# Patient Record
Sex: FEMALE | Race: WHITE | NOT HISPANIC OR LATINO | Employment: OTHER
[De-identification: names, ages, dates, MRNs, and addresses within clinical notes are randomized per-mention and may not be internally consistent; named-entity substitution may affect disease eponyms.]

---

## 2017-10-23 ENCOUNTER — RECORDS - HEALTHEAST (OUTPATIENT)
Dept: ADMINISTRATIVE | Facility: OTHER | Age: 82
End: 2017-10-23

## 2017-10-23 LAB
LAB AP CHARGES (HE HISTORICAL CONVERSION): NORMAL
PATH REPORT.COMMENTS IMP SPEC: NORMAL
PATH REPORT.COMMENTS IMP SPEC: NORMAL
PATH REPORT.FINAL DX SPEC: NORMAL
PATH REPORT.GROSS SPEC: NORMAL
PATH REPORT.MICROSCOPIC SPEC OTHER STN: NORMAL
PATH REPORT.RELEVANT HX SPEC: NORMAL
RESULT FLAG (HE HISTORICAL CONVERSION): NORMAL

## 2017-11-15 ENCOUNTER — HOSPITAL ENCOUNTER (OUTPATIENT)
Dept: MAMMOGRAPHY | Facility: CLINIC | Age: 82
Discharge: HOME | End: 2017-11-15

## 2017-11-15 ENCOUNTER — COMMUNICATION - HEALTHEAST (OUTPATIENT)
Dept: TELEHEALTH | Facility: CLINIC | Age: 82
End: 2017-11-15

## 2017-11-15 DIAGNOSIS — Z12.31 VISIT FOR SCREENING MAMMOGRAM: ICD-10-CM

## 2018-05-07 ENCOUNTER — RECORDS - HEALTHEAST (OUTPATIENT)
Dept: LAB | Facility: CLINIC | Age: 83
End: 2018-05-07

## 2018-05-07 LAB
ALBUMIN SERPL-MCNC: 3.9 G/DL (ref 3.5–5)
ALP SERPL-CCNC: 51 U/L (ref 45–120)
ALT SERPL W P-5'-P-CCNC: 12 U/L (ref 0–45)
ANION GAP SERPL CALCULATED.3IONS-SCNC: 11 MMOL/L (ref 5–18)
AST SERPL W P-5'-P-CCNC: 21 U/L (ref 0–40)
BILIRUB SERPL-MCNC: 0.6 MG/DL (ref 0–1)
BUN SERPL-MCNC: 17 MG/DL (ref 8–28)
CALCIUM SERPL-MCNC: 9.7 MG/DL (ref 8.5–10.5)
CHLORIDE BLD-SCNC: 102 MMOL/L (ref 98–107)
CHOLEST SERPL-MCNC: 196 MG/DL
CO2 SERPL-SCNC: 22 MMOL/L (ref 22–31)
CREAT SERPL-MCNC: 1.01 MG/DL (ref 0.6–1.1)
FASTING STATUS PATIENT QL REPORTED: ABNORMAL
GFR SERPL CREATININE-BSD FRML MDRD: 52 ML/MIN/1.73M2
GLUCOSE BLD-MCNC: 103 MG/DL (ref 70–125)
HDLC SERPL-MCNC: 47 MG/DL
LDLC SERPL CALC-MCNC: 116 MG/DL
POTASSIUM BLD-SCNC: 5.3 MMOL/L (ref 3.5–5)
PROT SERPL-MCNC: 6.6 G/DL (ref 6–8)
SODIUM SERPL-SCNC: 135 MMOL/L (ref 136–145)
TRIGL SERPL-MCNC: 164 MG/DL
TSH SERPL DL<=0.005 MIU/L-ACNC: 2.32 UIU/ML (ref 0.3–5)

## 2018-07-05 ENCOUNTER — RECORDS - HEALTHEAST (OUTPATIENT)
Dept: LAB | Facility: CLINIC | Age: 83
End: 2018-07-05

## 2018-07-05 LAB — URATE SERPL-MCNC: 3.4 MG/DL (ref 2–7.5)

## 2018-11-19 ENCOUNTER — RECORDS - HEALTHEAST (OUTPATIENT)
Dept: LAB | Facility: CLINIC | Age: 83
End: 2018-11-19

## 2018-11-19 LAB
ANION GAP SERPL CALCULATED.3IONS-SCNC: 10 MMOL/L (ref 5–18)
BUN SERPL-MCNC: 19 MG/DL (ref 8–28)
CALCIUM SERPL-MCNC: 10.1 MG/DL (ref 8.5–10.5)
CHLORIDE BLD-SCNC: 102 MMOL/L (ref 98–107)
CO2 SERPL-SCNC: 26 MMOL/L (ref 22–31)
CREAT SERPL-MCNC: 0.99 MG/DL (ref 0.6–1.1)
GFR SERPL CREATININE-BSD FRML MDRD: 53 ML/MIN/1.73M2
GLUCOSE BLD-MCNC: 125 MG/DL (ref 70–125)
POTASSIUM BLD-SCNC: 4.3 MMOL/L (ref 3.5–5)
SODIUM SERPL-SCNC: 138 MMOL/L (ref 136–145)

## 2019-03-20 ENCOUNTER — RECORDS - HEALTHEAST (OUTPATIENT)
Dept: LAB | Facility: CLINIC | Age: 84
End: 2019-03-20

## 2019-03-20 LAB — TSH SERPL DL<=0.005 MIU/L-ACNC: 1.04 UIU/ML (ref 0.3–5)

## 2019-07-24 ENCOUNTER — RECORDS - HEALTHEAST (OUTPATIENT)
Dept: LAB | Facility: CLINIC | Age: 84
End: 2019-07-24

## 2019-07-24 LAB
ALBUMIN SERPL-MCNC: 4.2 G/DL (ref 3.5–5)
ALP SERPL-CCNC: 64 U/L (ref 45–120)
ALT SERPL W P-5'-P-CCNC: 12 U/L (ref 0–45)
ANION GAP SERPL CALCULATED.3IONS-SCNC: 8 MMOL/L (ref 5–18)
AST SERPL W P-5'-P-CCNC: 20 U/L (ref 0–40)
BILIRUB SERPL-MCNC: 0.5 MG/DL (ref 0–1)
BUN SERPL-MCNC: 18 MG/DL (ref 8–28)
CALCIUM SERPL-MCNC: 9.9 MG/DL (ref 8.5–10.5)
CHLORIDE BLD-SCNC: 101 MMOL/L (ref 98–107)
CHOLEST SERPL-MCNC: 202 MG/DL
CO2 SERPL-SCNC: 26 MMOL/L (ref 22–31)
CREAT SERPL-MCNC: 1.14 MG/DL (ref 0.6–1.1)
FASTING STATUS PATIENT QL REPORTED: ABNORMAL
GFR SERPL CREATININE-BSD FRML MDRD: 45 ML/MIN/1.73M2
GLUCOSE BLD-MCNC: 130 MG/DL (ref 70–125)
HDLC SERPL-MCNC: 52 MG/DL
LDLC SERPL CALC-MCNC: 125 MG/DL
MAGNESIUM SERPL-MCNC: 2.2 MG/DL (ref 1.8–2.6)
POTASSIUM BLD-SCNC: 4.9 MMOL/L (ref 3.5–5)
PROT SERPL-MCNC: 6.8 G/DL (ref 6–8)
SODIUM SERPL-SCNC: 135 MMOL/L (ref 136–145)
TRIGL SERPL-MCNC: 125 MG/DL
TSH SERPL DL<=0.005 MIU/L-ACNC: 0.85 UIU/ML (ref 0.3–5)
VIT B12 SERPL-MCNC: 1238 PG/ML (ref 213–816)

## 2019-07-25 LAB — 25(OH)D3 SERPL-MCNC: 49.5 NG/ML (ref 30–80)

## 2019-08-14 ENCOUNTER — RECORDS - HEALTHEAST (OUTPATIENT)
Dept: LAB | Facility: CLINIC | Age: 84
End: 2019-08-14

## 2019-08-15 LAB — HBA1C MFR BLD: 6.2 % (ref 4.2–6.1)

## 2020-01-07 ENCOUNTER — RECORDS - HEALTHEAST (OUTPATIENT)
Dept: LAB | Facility: CLINIC | Age: 85
End: 2020-01-07

## 2020-01-07 LAB
ANION GAP SERPL CALCULATED.3IONS-SCNC: 9 MMOL/L (ref 5–18)
BUN SERPL-MCNC: 17 MG/DL (ref 8–28)
CALCIUM SERPL-MCNC: 9.6 MG/DL (ref 8.5–10.5)
CHLORIDE BLD-SCNC: 97 MMOL/L (ref 98–107)
CO2 SERPL-SCNC: 25 MMOL/L (ref 22–31)
CREAT SERPL-MCNC: 1.1 MG/DL (ref 0.6–1.1)
GFR SERPL CREATININE-BSD FRML MDRD: 47 ML/MIN/1.73M2
GLUCOSE BLD-MCNC: 107 MG/DL (ref 70–125)
POTASSIUM BLD-SCNC: 4.7 MMOL/L (ref 3.5–5)
SODIUM SERPL-SCNC: 131 MMOL/L (ref 136–145)

## 2020-01-08 LAB — HBA1C MFR BLD: 5.8 % (ref 4.2–6.1)

## 2020-09-25 ENCOUNTER — MEDICAL CORRESPONDENCE (OUTPATIENT)
Dept: HEALTH INFORMATION MANAGEMENT | Facility: CLINIC | Age: 85
End: 2020-09-25

## 2020-09-29 ENCOUNTER — RECORDS - HEALTHEAST (OUTPATIENT)
Dept: LAB | Facility: CLINIC | Age: 85
End: 2020-09-29

## 2020-09-29 LAB
ALBUMIN SERPL-MCNC: 4.2 G/DL (ref 3.5–5)
ALP SERPL-CCNC: 46 U/L (ref 45–120)
ALT SERPL W P-5'-P-CCNC: 10 U/L (ref 0–45)
ANION GAP SERPL CALCULATED.3IONS-SCNC: 11 MMOL/L (ref 5–18)
AST SERPL W P-5'-P-CCNC: 19 U/L (ref 0–40)
BILIRUB SERPL-MCNC: 0.6 MG/DL (ref 0–1)
BUN SERPL-MCNC: 18 MG/DL (ref 8–28)
CALCIUM SERPL-MCNC: 10.6 MG/DL (ref 8.5–10.5)
CHLORIDE BLD-SCNC: 99 MMOL/L (ref 98–107)
CHOLEST SERPL-MCNC: 195 MG/DL
CO2 SERPL-SCNC: 24 MMOL/L (ref 22–31)
CREAT SERPL-MCNC: 1.08 MG/DL (ref 0.6–1.1)
FASTING STATUS PATIENT QL REPORTED: NO
FERRITIN SERPL-MCNC: 359 NG/ML (ref 10–130)
GFR SERPL CREATININE-BSD FRML MDRD: 48 ML/MIN/1.73M2
GLUCOSE BLD-MCNC: 105 MG/DL (ref 70–125)
HDLC SERPL-MCNC: 56 MG/DL
IRON SATN MFR SERPL: 33 % (ref 20–50)
IRON SERPL-MCNC: 91 UG/DL (ref 42–175)
LDLC SERPL CALC-MCNC: 115 MG/DL
MAGNESIUM SERPL-MCNC: 1.9 MG/DL (ref 1.8–2.6)
POTASSIUM BLD-SCNC: 4.7 MMOL/L (ref 3.5–5)
PROT SERPL-MCNC: 6.7 G/DL (ref 6–8)
SODIUM SERPL-SCNC: 134 MMOL/L (ref 136–145)
TIBC SERPL-MCNC: 276 UG/DL (ref 313–563)
TRANSFERRIN SERPL-MCNC: 221 MG/DL (ref 212–360)
TRIGL SERPL-MCNC: 120 MG/DL
TSH SERPL DL<=0.005 MIU/L-ACNC: 1.56 UIU/ML (ref 0.3–5)
VIT B12 SERPL-MCNC: 521 PG/ML (ref 213–816)

## 2020-09-30 LAB — 25(OH)D3 SERPL-MCNC: 51.3 NG/ML (ref 30–80)

## 2020-12-03 ENCOUNTER — RECORDS - HEALTHEAST (OUTPATIENT)
Dept: LAB | Facility: CLINIC | Age: 85
End: 2020-12-03

## 2020-12-03 LAB
SARS-COV-2 PCR COMMENT: NORMAL
SARS-COV-2 RNA SPEC QL NAA+PROBE: NEGATIVE
SARS-COV-2 VIRUS SPECIMEN SOURCE: NORMAL

## 2020-12-08 ENCOUNTER — RECORDS - HEALTHEAST (OUTPATIENT)
Dept: LAB | Facility: CLINIC | Age: 85
End: 2020-12-08

## 2020-12-08 LAB
ANION GAP SERPL CALCULATED.3IONS-SCNC: 11 MMOL/L (ref 5–18)
BUN SERPL-MCNC: 22 MG/DL (ref 8–28)
CALCIUM SERPL-MCNC: 9 MG/DL (ref 8.5–10.5)
CHLORIDE BLD-SCNC: 101 MMOL/L (ref 98–107)
CO2 SERPL-SCNC: 23 MMOL/L (ref 22–31)
CREAT SERPL-MCNC: 1.08 MG/DL (ref 0.6–1.1)
FERRITIN SERPL-MCNC: 361 NG/ML (ref 10–130)
GFR SERPL CREATININE-BSD FRML MDRD: 48 ML/MIN/1.73M2
GLUCOSE BLD-MCNC: 105 MG/DL (ref 70–125)
POTASSIUM BLD-SCNC: 4.6 MMOL/L (ref 3.5–5)
SODIUM SERPL-SCNC: 135 MMOL/L (ref 136–145)

## 2021-01-01 ENCOUNTER — LAB REQUISITION (OUTPATIENT)
Dept: LAB | Facility: CLINIC | Age: 86
End: 2021-01-01
Payer: COMMERCIAL

## 2021-01-01 ENCOUNTER — RECORDS - HEALTHEAST (OUTPATIENT)
Dept: ADMINISTRATIVE | Facility: CLINIC | Age: 86
End: 2021-01-01

## 2021-01-01 ENCOUNTER — LAB REQUISITION (OUTPATIENT)
Dept: LAB | Facility: CLINIC | Age: 86
End: 2021-01-01

## 2021-01-01 ENCOUNTER — RECORDS - HEALTHEAST (OUTPATIENT)
Dept: SCHEDULING | Facility: CLINIC | Age: 86
End: 2021-01-01

## 2021-01-01 DIAGNOSIS — U07.1 COVID-19: ICD-10-CM

## 2021-01-01 DIAGNOSIS — E03.9 HYPOTHYROIDISM, UNSPECIFIED: ICD-10-CM

## 2021-01-01 DIAGNOSIS — Z12.31 OTHER SCREENING MAMMOGRAM: ICD-10-CM

## 2021-01-01 DIAGNOSIS — R89.9 UNSPECIFIED ABNORMAL FINDING IN SPECIMENS FROM OTHER ORGANS, SYSTEMS AND TISSUES: ICD-10-CM

## 2021-01-01 DIAGNOSIS — I10 ESSENTIAL (PRIMARY) HYPERTENSION: ICD-10-CM

## 2021-01-01 DIAGNOSIS — D64.9 ANEMIA, UNSPECIFIED: ICD-10-CM

## 2021-01-01 LAB
ALBUMIN SERPL-MCNC: 3.7 G/DL (ref 3.5–5)
ALP SERPL-CCNC: 58 U/L (ref 45–120)
ALT SERPL W P-5'-P-CCNC: 11 U/L (ref 0–45)
ALT SERPL W P-5'-P-CCNC: 9 U/L (ref 0–45)
ANION GAP SERPL CALCULATED.3IONS-SCNC: 10 MMOL/L (ref 5–18)
AST SERPL W P-5'-P-CCNC: 17 U/L (ref 0–40)
AST SERPL W P-5'-P-CCNC: 19 U/L (ref 0–40)
BILIRUB SERPL-MCNC: 0.4 MG/DL (ref 0–1)
BUN SERPL-MCNC: 22 MG/DL (ref 8–28)
CALCIUM SERPL-MCNC: 9.9 MG/DL (ref 8.5–10.5)
CHLORIDE BLD-SCNC: 102 MMOL/L (ref 98–107)
CO2 SERPL-SCNC: 25 MMOL/L (ref 22–31)
CREAT SERPL-MCNC: 1.18 MG/DL (ref 0.6–1.1)
FERRITIN SERPL-MCNC: 307 NG/ML (ref 10–130)
FERRITIN SERPL-MCNC: 328 NG/ML (ref 10–130)
GFR SERPL CREATININE-BSD FRML MDRD: 44 ML/MIN/1.73M2
GLUCOSE BLD-MCNC: 109 MG/DL (ref 70–125)
IRON SATN MFR SERPL: 28 % (ref 20–50)
IRON SERPL-MCNC: 81 UG/DL (ref 42–175)
POTASSIUM BLD-SCNC: 4.8 MMOL/L (ref 3.5–5)
PROT SERPL-MCNC: 6.6 G/DL (ref 6–8)
SARS-COV-2 RNA RESP QL NAA+PROBE: NEGATIVE
SARS-COV-2 RNA RESP QL NAA+PROBE: NOT DETECTED
SARS-COV-2 RNA RESP QL NAA+PROBE: NOT DETECTED
SODIUM SERPL-SCNC: 137 MMOL/L (ref 136–145)
T4 FREE SERPL-MCNC: 1.05 NG/DL (ref 0.7–1.8)
TIBC SERPL-MCNC: 286 UG/DL (ref 313–563)
TRANSFERRIN SERPL-MCNC: 229 MG/DL (ref 212–360)
TSH SERPL DL<=0.005 MIU/L-ACNC: 5.39 UIU/ML (ref 0.3–5)

## 2021-01-01 PROCEDURE — U0005 INFEC AGEN DETEC AMPLI PROBE: HCPCS | Mod: ORL | Performed by: FAMILY MEDICINE

## 2021-01-01 PROCEDURE — 36415 COLL VENOUS BLD VENIPUNCTURE: CPT | Performed by: STUDENT IN AN ORGANIZED HEALTH CARE EDUCATION/TRAINING PROGRAM

## 2021-01-01 PROCEDURE — U0003 INFECTIOUS AGENT DETECTION BY NUCLEIC ACID (DNA OR RNA); SEVERE ACUTE RESPIRATORY SYNDROME CORONAVIRUS 2 (SARS-COV-2) (CORONAVIRUS DISEASE [COVID-19]), AMPLIFIED PROBE TECHNIQUE, MAKING USE OF HIGH THROUGHPUT TECHNOLOGIES AS DESCRIBED BY CMS-2020-01-R: HCPCS | Mod: ORL | Performed by: FAMILY MEDICINE

## 2021-01-01 PROCEDURE — 84460 ALANINE AMINO (ALT) (SGPT): CPT | Performed by: STUDENT IN AN ORGANIZED HEALTH CARE EDUCATION/TRAINING PROGRAM

## 2021-01-01 PROCEDURE — 82728 ASSAY OF FERRITIN: CPT | Performed by: STUDENT IN AN ORGANIZED HEALTH CARE EDUCATION/TRAINING PROGRAM

## 2021-01-01 PROCEDURE — 80053 COMPREHEN METABOLIC PANEL: CPT | Performed by: STUDENT IN AN ORGANIZED HEALTH CARE EDUCATION/TRAINING PROGRAM

## 2021-01-01 PROCEDURE — 83540 ASSAY OF IRON: CPT | Performed by: STUDENT IN AN ORGANIZED HEALTH CARE EDUCATION/TRAINING PROGRAM

## 2021-01-01 PROCEDURE — 84443 ASSAY THYROID STIM HORMONE: CPT | Performed by: STUDENT IN AN ORGANIZED HEALTH CARE EDUCATION/TRAINING PROGRAM

## 2021-01-01 PROCEDURE — 84450 TRANSFERASE (AST) (SGOT): CPT | Performed by: STUDENT IN AN ORGANIZED HEALTH CARE EDUCATION/TRAINING PROGRAM

## 2021-01-01 PROCEDURE — 84439 ASSAY OF FREE THYROXINE: CPT | Performed by: STUDENT IN AN ORGANIZED HEALTH CARE EDUCATION/TRAINING PROGRAM

## 2021-03-29 ENCOUNTER — RECORDS - HEALTHEAST (OUTPATIENT)
Dept: LAB | Facility: CLINIC | Age: 86
End: 2021-03-29

## 2021-03-29 LAB
ALBUMIN SERPL-MCNC: 3.9 G/DL (ref 3.5–5)
ALP SERPL-CCNC: 49 U/L (ref 45–120)
ALT SERPL W P-5'-P-CCNC: <9 U/L (ref 0–45)
ANION GAP SERPL CALCULATED.3IONS-SCNC: 10 MMOL/L (ref 5–18)
AST SERPL W P-5'-P-CCNC: 19 U/L (ref 0–40)
BILIRUB SERPL-MCNC: 0.6 MG/DL (ref 0–1)
BUN SERPL-MCNC: 20 MG/DL (ref 8–28)
CALCIUM SERPL-MCNC: 9.4 MG/DL (ref 8.5–10.5)
CHLORIDE BLD-SCNC: 101 MMOL/L (ref 98–107)
CHOLEST SERPL-MCNC: 172 MG/DL
CO2 SERPL-SCNC: 25 MMOL/L (ref 22–31)
CREAT SERPL-MCNC: 1.1 MG/DL (ref 0.6–1.1)
FASTING STATUS PATIENT QL REPORTED: ABNORMAL
FERRITIN SERPL-MCNC: 379 NG/ML (ref 10–130)
GFR SERPL CREATININE-BSD FRML MDRD: 47 ML/MIN/1.73M2
GLUCOSE BLD-MCNC: 100 MG/DL (ref 70–125)
HDLC SERPL-MCNC: 46 MG/DL
LDLC SERPL CALC-MCNC: 100 MG/DL
POTASSIUM BLD-SCNC: 5.1 MMOL/L (ref 3.5–5)
PROT SERPL-MCNC: 6.4 G/DL (ref 6–8)
SODIUM SERPL-SCNC: 136 MMOL/L (ref 136–145)
TRIGL SERPL-MCNC: 130 MG/DL

## 2021-05-08 ENCOUNTER — RECORDS - HEALTHEAST (OUTPATIENT)
Dept: LAB | Facility: CLINIC | Age: 86
End: 2021-05-08

## 2021-05-16 ENCOUNTER — RECORDS - HEALTHEAST (OUTPATIENT)
Dept: LAB | Facility: CLINIC | Age: 86
End: 2021-05-16

## 2021-05-25 ENCOUNTER — RECORDS - HEALTHEAST (OUTPATIENT)
Dept: ADMINISTRATIVE | Facility: CLINIC | Age: 86
End: 2021-05-25

## 2021-05-26 ENCOUNTER — RECORDS - HEALTHEAST (OUTPATIENT)
Dept: ADMINISTRATIVE | Facility: CLINIC | Age: 86
End: 2021-05-26

## 2021-05-27 ENCOUNTER — RECORDS - HEALTHEAST (OUTPATIENT)
Dept: ADMINISTRATIVE | Facility: CLINIC | Age: 86
End: 2021-05-27

## 2021-05-28 ENCOUNTER — RECORDS - HEALTHEAST (OUTPATIENT)
Dept: ADMINISTRATIVE | Facility: CLINIC | Age: 86
End: 2021-05-28

## 2021-05-29 ENCOUNTER — RECORDS - HEALTHEAST (OUTPATIENT)
Dept: ADMINISTRATIVE | Facility: CLINIC | Age: 86
End: 2021-05-29

## 2021-05-30 ENCOUNTER — RECORDS - HEALTHEAST (OUTPATIENT)
Dept: ADMINISTRATIVE | Facility: CLINIC | Age: 86
End: 2021-05-30

## 2021-07-13 ENCOUNTER — RECORDS - HEALTHEAST (OUTPATIENT)
Dept: ADMINISTRATIVE | Facility: CLINIC | Age: 86
End: 2021-07-13

## 2022-01-01 ENCOUNTER — LAB REQUISITION (OUTPATIENT)
Dept: LAB | Facility: CLINIC | Age: 87
End: 2022-01-01

## 2022-01-01 ENCOUNTER — APPOINTMENT (OUTPATIENT)
Dept: RADIOLOGY | Facility: CLINIC | Age: 87
DRG: 199 | End: 2022-01-01
Attending: EMERGENCY MEDICINE
Payer: COMMERCIAL

## 2022-01-01 ENCOUNTER — HOSPITAL ENCOUNTER (INPATIENT)
Facility: CLINIC | Age: 87
LOS: 2 days | DRG: 199 | End: 2022-07-15
Attending: EMERGENCY MEDICINE | Admitting: INTERNAL MEDICINE
Payer: COMMERCIAL

## 2022-01-01 VITALS
RESPIRATION RATE: 24 BRPM | SYSTOLIC BLOOD PRESSURE: 140 MMHG | BODY MASS INDEX: 35.26 KG/M2 | DIASTOLIC BLOOD PRESSURE: 73 MMHG | OXYGEN SATURATION: 91 % | WEIGHT: 186.6 LBS | HEART RATE: 96 BPM | TEMPERATURE: 96.8 F

## 2022-01-01 DIAGNOSIS — J96.01 ACUTE RESPIRATORY FAILURE WITH HYPOXIA (H): ICD-10-CM

## 2022-01-01 DIAGNOSIS — E03.9 HYPOTHYROIDISM, UNSPECIFIED: ICD-10-CM

## 2022-01-01 DIAGNOSIS — N18.31 CHRONIC KIDNEY DISEASE, STAGE 3A (H): ICD-10-CM

## 2022-01-01 DIAGNOSIS — R53.83 OTHER FATIGUE: ICD-10-CM

## 2022-01-01 DIAGNOSIS — N17.9 ACUTE RENAL FAILURE, UNSPECIFIED ACUTE RENAL FAILURE TYPE (H): ICD-10-CM

## 2022-01-01 DIAGNOSIS — I12.9 HYPERTENSIVE CHRONIC KIDNEY DISEASE WITH STAGE 1 THROUGH STAGE 4 CHRONIC KIDNEY DISEASE, OR UNSPECIFIED CHRONIC KIDNEY DISEASE: ICD-10-CM

## 2022-01-01 DIAGNOSIS — R91.8 LUNG MASS: ICD-10-CM

## 2022-01-01 DIAGNOSIS — E78.5 HYPERLIPIDEMIA, UNSPECIFIED: ICD-10-CM

## 2022-01-01 DIAGNOSIS — E55.9 VITAMIN D DEFICIENCY, UNSPECIFIED: ICD-10-CM

## 2022-01-01 DIAGNOSIS — J93.9 PNEUMOTHORAX ON RIGHT: ICD-10-CM

## 2022-01-01 DIAGNOSIS — R60.9 EDEMA, UNSPECIFIED: ICD-10-CM

## 2022-01-01 LAB
ALBUMIN SERPL-MCNC: 2.1 G/DL (ref 3.5–5)
ALP SERPL-CCNC: 68 U/L (ref 45–120)
ALT SERPL W P-5'-P-CCNC: <9 U/L (ref 0–45)
ANION GAP SERPL CALCULATED.3IONS-SCNC: 12 MMOL/L (ref 5–18)
ANION GAP SERPL CALCULATED.3IONS-SCNC: 12 MMOL/L (ref 5–18)
ANION GAP SERPL CALCULATED.3IONS-SCNC: 15 MMOL/L (ref 5–18)
ANION GAP SERPL CALCULATED.3IONS-SCNC: 16 MMOL/L (ref 5–18)
APPEARANCE FLD: ABNORMAL
AST SERPL W P-5'-P-CCNC: 13 U/L (ref 0–40)
ATRIAL RATE - MUSE: 98 BPM
BASE EXCESS BLDV CALC-SCNC: -6.2 MMOL/L
BASOPHILS # BLD MANUAL: 0 10E3/UL (ref 0–0.2)
BASOPHILS NFR BLD MANUAL: 0 %
BILIRUB SERPL-MCNC: 0.7 MG/DL (ref 0–1)
BUN SERPL-MCNC: 24 MG/DL (ref 8–28)
BUN SERPL-MCNC: 29 MG/DL (ref 8–28)
BUN SERPL-MCNC: 31 MG/DL (ref 8–28)
BUN SERPL-MCNC: 51 MG/DL (ref 8–28)
CALCIUM SERPL-MCNC: 8.5 MG/DL (ref 8.5–10.5)
CALCIUM SERPL-MCNC: 8.6 MG/DL (ref 8.5–10.5)
CALCIUM SERPL-MCNC: 9.8 MG/DL (ref 8.5–10.5)
CALCIUM SERPL-MCNC: 9.8 MG/DL (ref 8.5–10.5)
CELL COUNT BODY FLUID SOURCE: ABNORMAL
CHLORIDE BLD-SCNC: 102 MMOL/L (ref 98–107)
CHLORIDE BLD-SCNC: 105 MMOL/L (ref 98–107)
CHLORIDE BLD-SCNC: 95 MMOL/L (ref 98–107)
CHLORIDE BLD-SCNC: 96 MMOL/L (ref 98–107)
CHOLEST SERPL-MCNC: 314 MG/DL
CO2 SERPL-SCNC: 20 MMOL/L (ref 22–31)
CO2 SERPL-SCNC: 21 MMOL/L (ref 22–31)
CO2 SERPL-SCNC: 21 MMOL/L (ref 22–31)
CO2 SERPL-SCNC: 22 MMOL/L (ref 22–31)
COLOR FLD: YELLOW
CREAT SERPL-MCNC: 1.28 MG/DL (ref 0.6–1.1)
CREAT SERPL-MCNC: 1.31 MG/DL (ref 0.6–1.1)
CREAT SERPL-MCNC: 1.5 MG/DL (ref 0.6–1.1)
CREAT SERPL-MCNC: 4.64 MG/DL (ref 0.6–1.1)
DEPRECATED CALCIDIOL+CALCIFEROL SERPL-MC: 60 UG/L (ref 20–75)
DIASTOLIC BLOOD PRESSURE - MUSE: 102 MMHG
EOSINOPHIL # BLD MANUAL: 0 10E3/UL (ref 0–0.7)
EOSINOPHIL NFR BLD MANUAL: 0 %
ERYTHROCYTE [DISTWIDTH] IN BLOOD BY AUTOMATED COUNT: 14.5 % (ref 10–15)
FASTING STATUS PATIENT QL REPORTED: ABNORMAL
FERRITIN SERPL-MCNC: 292 NG/ML (ref 10–130)
GFR SERPL CREATININE-BSD FRML MDRD: 33 ML/MIN/1.73M2
GFR SERPL CREATININE-BSD FRML MDRD: 38 ML/MIN/1.73M2
GFR SERPL CREATININE-BSD FRML MDRD: 40 ML/MIN/1.73M2
GFR SERPL CREATININE-BSD FRML MDRD: 8 ML/MIN/1.73M2
GLUCOSE BLD-MCNC: 156 MG/DL (ref 70–125)
GLUCOSE BLD-MCNC: 174 MG/DL (ref 70–125)
GLUCOSE BLD-MCNC: 92 MG/DL (ref 70–125)
GLUCOSE BLD-MCNC: 96 MG/DL (ref 70–125)
GLUCOSE BODY FLUID SOURCE: NORMAL
GLUCOSE FLD-MCNC: <5 MG/DL
HCO3 BLDV-SCNC: 19 MMOL/L (ref 24–30)
HCT VFR BLD AUTO: 29.7 % (ref 35–47)
HDLC SERPL-MCNC: 46 MG/DL
HGB BLD-MCNC: 9.9 G/DL (ref 11.7–15.7)
INR PPP: 1.28 (ref 0.85–1.15)
INTERPRETATION ECG - MUSE: NORMAL
IRON SERPL-MCNC: 22 UG/DL (ref 42–175)
LACTATE SERPL-SCNC: 3.7 MMOL/L (ref 0.7–2)
LD BODY BODY FLUID SOURCE: NORMAL
LDH FLD L TO P-CCNC: 1416 U/L
LDLC SERPL CALC-MCNC: 232 MG/DL
LYMPHOCYTES # BLD MANUAL: 0.6 10E3/UL (ref 0.8–5.3)
LYMPHOCYTES NFR BLD MANUAL: 6 %
LYMPHOCYTES NFR FLD MANUAL: 6 %
MCH RBC QN AUTO: 30.7 PG (ref 26.5–33)
MCHC RBC AUTO-ENTMCNC: 33.3 G/DL (ref 31.5–36.5)
MCV RBC AUTO: 92 FL (ref 78–100)
METAMYELOCYTES # BLD MANUAL: 0.4 10E3/UL
METAMYELOCYTES NFR BLD MANUAL: 4 %
MONOCYTES # BLD MANUAL: 0.4 10E3/UL (ref 0–1.3)
MONOCYTES NFR BLD MANUAL: 4 %
MONOS+MACROS NFR FLD MANUAL: NORMAL %
MYELOCYTES # BLD MANUAL: 0.1 10E3/UL
MYELOCYTES NFR BLD MANUAL: 1 %
NEUTROPHILS # BLD MANUAL: 8.1 10E3/UL (ref 1.6–8.3)
NEUTROPHILS NFR BLD MANUAL: 85 %
NEUTS BAND NFR FLD MANUAL: 94 %
NRBC # BLD AUTO: 0.1 10E3/UL
NRBC BLD MANUAL-RTO: 1 %
OXYHGB MFR BLDV: 33.7 % (ref 70–75)
P AXIS - MUSE: 60 DEGREES
PCO2 BLDV: 45 MM HG (ref 35–50)
PH BLDV: 7.27 [PH] (ref 7.35–7.45)
PH BODY FLUID SOURCE: NORMAL
PH FLD: 7 PH
PLAT MORPH BLD: ABNORMAL
PLATELET # BLD AUTO: 346 10E3/UL (ref 150–450)
PO2 BLDV: 25 MM HG (ref 25–47)
POTASSIUM BLD-SCNC: 4.4 MMOL/L (ref 3.5–5)
POTASSIUM BLD-SCNC: 4.5 MMOL/L (ref 3.5–5)
POTASSIUM BLD-SCNC: 4.8 MMOL/L (ref 3.5–5)
POTASSIUM BLD-SCNC: 5.3 MMOL/L (ref 3.5–5)
PR INTERVAL - MUSE: 178 MS
PROT SERPL-MCNC: 6.1 G/DL (ref 6–8)
QRS DURATION - MUSE: 86 MS
QT - MUSE: 316 MS
QTC - MUSE: 403 MS
R AXIS - MUSE: 40 DEGREES
RADIOLOGIST FLAGS: ABNORMAL
RBC # BLD AUTO: 3.23 10E6/UL (ref 3.8–5.2)
RBC # FLD: 5 /UL
RBC MORPH BLD: ABNORMAL
SAO2 % BLDV: 34.3 % (ref 70–75)
SARS-COV-2 RNA RESP QL NAA+PROBE: NEGATIVE
SODIUM SERPL-SCNC: 129 MMOL/L (ref 136–145)
SODIUM SERPL-SCNC: 131 MMOL/L (ref 136–145)
SODIUM SERPL-SCNC: 136 MMOL/L (ref 136–145)
SODIUM SERPL-SCNC: 141 MMOL/L (ref 136–145)
SYSTOLIC BLOOD PRESSURE - MUSE: 203 MMHG
T AXIS - MUSE: -47 DEGREES
T4 FREE SERPL-MCNC: 0.7 NG/DL (ref 0.9–1.7)
T4 FREE SERPL-MCNC: 1.17 NG/DL (ref 0.7–1.8)
TRIGL SERPL-MCNC: 179 MG/DL
TROPONIN I SERPL-MCNC: 0.01 NG/ML (ref 0–0.29)
TSH SERPL DL<=0.005 MIU/L-ACNC: 36.69 UIU/ML (ref 0.3–5)
TSH SERPL DL<=0.005 MIU/L-ACNC: 6.24 UIU/ML (ref 0.3–5)
TSH SERPL DL<=0.005 MIU/L-ACNC: 7.59 UIU/ML (ref 0.3–5)
VENTRICULAR RATE- MUSE: 98 BPM
WBC # BLD AUTO: 9.5 10E3/UL (ref 4–11)
WBC # FLD AUTO: ABNORMAL /UL

## 2022-01-01 PROCEDURE — 80053 COMPREHEN METABOLIC PANEL: CPT | Performed by: EMERGENCY MEDICINE

## 2022-01-01 PROCEDURE — 80061 LIPID PANEL: CPT | Performed by: STUDENT IN AN ORGANIZED HEALTH CARE EDUCATION/TRAINING PROGRAM

## 2022-01-01 PROCEDURE — 999N000065 XR CHEST PORT 1 VIEW

## 2022-01-01 PROCEDURE — 250N000011 HC RX IP 250 OP 636: Performed by: INTERNAL MEDICINE

## 2022-01-01 PROCEDURE — 84484 ASSAY OF TROPONIN QUANT: CPT | Performed by: EMERGENCY MEDICINE

## 2022-01-01 PROCEDURE — 83605 ASSAY OF LACTIC ACID: CPT | Performed by: EMERGENCY MEDICINE

## 2022-01-01 PROCEDURE — 99239 HOSP IP/OBS DSCHRG MGMT >30: CPT | Performed by: INTERNAL MEDICINE

## 2022-01-01 PROCEDURE — 250N000009 HC RX 250: Performed by: INTERNAL MEDICINE

## 2022-01-01 PROCEDURE — 99233 SBSQ HOSP IP/OBS HIGH 50: CPT | Performed by: CLINICAL NURSE SPECIALIST

## 2022-01-01 PROCEDURE — 85007 BL SMEAR W/DIFF WBC COUNT: CPT | Performed by: EMERGENCY MEDICINE

## 2022-01-01 PROCEDURE — 83986 ASSAY PH BODY FLUID NOS: CPT | Performed by: EMERGENCY MEDICINE

## 2022-01-01 PROCEDURE — 80048 BASIC METABOLIC PNL TOTAL CA: CPT | Performed by: STUDENT IN AN ORGANIZED HEALTH CARE EDUCATION/TRAINING PROGRAM

## 2022-01-01 PROCEDURE — 87070 CULTURE OTHR SPECIMN AEROBIC: CPT | Performed by: EMERGENCY MEDICINE

## 2022-01-01 PROCEDURE — 82945 GLUCOSE OTHER FLUID: CPT | Performed by: EMERGENCY MEDICINE

## 2022-01-01 PROCEDURE — 82728 ASSAY OF FERRITIN: CPT | Performed by: NURSE PRACTITIONER

## 2022-01-01 PROCEDURE — 84443 ASSAY THYROID STIM HORMONE: CPT | Performed by: STUDENT IN AN ORGANIZED HEALTH CARE EDUCATION/TRAINING PROGRAM

## 2022-01-01 PROCEDURE — 99285 EMERGENCY DEPT VISIT HI MDM: CPT | Mod: 25

## 2022-01-01 PROCEDURE — 99223 1ST HOSP IP/OBS HIGH 75: CPT | Performed by: INTERNAL MEDICINE

## 2022-01-01 PROCEDURE — 99232 SBSQ HOSP IP/OBS MODERATE 35: CPT | Performed by: INTERNAL MEDICINE

## 2022-01-01 PROCEDURE — 250N000011 HC RX IP 250 OP 636: Performed by: EMERGENCY MEDICINE

## 2022-01-01 PROCEDURE — C9803 HOPD COVID-19 SPEC COLLECT: HCPCS

## 2022-01-01 PROCEDURE — 82805 BLOOD GASES W/O2 SATURATION: CPT | Performed by: EMERGENCY MEDICINE

## 2022-01-01 PROCEDURE — 32551 INSERTION OF CHEST TUBE: CPT | Mod: RT

## 2022-01-01 PROCEDURE — 250N000009 HC RX 250: Performed by: CLINICAL NURSE SPECIALIST

## 2022-01-01 PROCEDURE — 80048 BASIC METABOLIC PNL TOTAL CA: CPT | Performed by: NURSE PRACTITIONER

## 2022-01-01 PROCEDURE — 82306 VITAMIN D 25 HYDROXY: CPT | Performed by: STUDENT IN AN ORGANIZED HEALTH CARE EDUCATION/TRAINING PROGRAM

## 2022-01-01 PROCEDURE — 999N000157 HC STATISTIC RCP TIME EA 10 MIN

## 2022-01-01 PROCEDURE — 71045 X-RAY EXAM CHEST 1 VIEW: CPT

## 2022-01-01 PROCEDURE — 89051 BODY FLUID CELL COUNT: CPT | Performed by: EMERGENCY MEDICINE

## 2022-01-01 PROCEDURE — 96361 HYDRATE IV INFUSION ADD-ON: CPT

## 2022-01-01 PROCEDURE — 0W9930Z DRAINAGE OF RIGHT PLEURAL CAVITY WITH DRAINAGE DEVICE, PERCUTANEOUS APPROACH: ICD-10-PCS | Performed by: EMERGENCY MEDICINE

## 2022-01-01 PROCEDURE — 36415 COLL VENOUS BLD VENIPUNCTURE: CPT | Performed by: EMERGENCY MEDICINE

## 2022-01-01 PROCEDURE — 83540 ASSAY OF IRON: CPT | Performed by: NURSE PRACTITIONER

## 2022-01-01 PROCEDURE — 96375 TX/PRO/DX INJ NEW DRUG ADDON: CPT

## 2022-01-01 PROCEDURE — 87040 BLOOD CULTURE FOR BACTERIA: CPT | Performed by: EMERGENCY MEDICINE

## 2022-01-01 PROCEDURE — 84443 ASSAY THYROID STIM HORMONE: CPT | Performed by: EMERGENCY MEDICINE

## 2022-01-01 PROCEDURE — 250N000013 HC RX MED GY IP 250 OP 250 PS 637: Performed by: CLINICAL NURSE SPECIALIST

## 2022-01-01 PROCEDURE — 250N000013 HC RX MED GY IP 250 OP 250 PS 637: Performed by: INTERNAL MEDICINE

## 2022-01-01 PROCEDURE — 120N000001 HC R&B MED SURG/OB

## 2022-01-01 PROCEDURE — 99222 1ST HOSP IP/OBS MODERATE 55: CPT | Performed by: NURSE PRACTITIONER

## 2022-01-01 PROCEDURE — U0005 INFEC AGEN DETEC AMPLI PROBE: HCPCS | Performed by: EMERGENCY MEDICINE

## 2022-01-01 PROCEDURE — 93005 ELECTROCARDIOGRAM TRACING: CPT | Performed by: EMERGENCY MEDICINE

## 2022-01-01 PROCEDURE — 85027 COMPLETE CBC AUTOMATED: CPT | Performed by: EMERGENCY MEDICINE

## 2022-01-01 PROCEDURE — 84439 ASSAY OF FREE THYROXINE: CPT | Performed by: EMERGENCY MEDICINE

## 2022-01-01 PROCEDURE — 96374 THER/PROPH/DIAG INJ IV PUSH: CPT

## 2022-01-01 PROCEDURE — 83615 LACTATE (LD) (LDH) ENZYME: CPT | Performed by: EMERGENCY MEDICINE

## 2022-01-01 PROCEDURE — 258N000003 HC RX IP 258 OP 636: Performed by: EMERGENCY MEDICINE

## 2022-01-01 PROCEDURE — 999N000009 HC STATISTIC AIRWAY CARE

## 2022-01-01 PROCEDURE — 84439 ASSAY OF FREE THYROXINE: CPT | Performed by: STUDENT IN AN ORGANIZED HEALTH CARE EDUCATION/TRAINING PROGRAM

## 2022-01-01 PROCEDURE — 250N000013 HC RX MED GY IP 250 OP 250 PS 637: Performed by: EMERGENCY MEDICINE

## 2022-01-01 PROCEDURE — 85610 PROTHROMBIN TIME: CPT | Performed by: EMERGENCY MEDICINE

## 2022-01-01 RX ORDER — LORAZEPAM 2 MG/ML
1 INJECTION INTRAMUSCULAR
Status: DISCONTINUED | OUTPATIENT
Start: 2022-01-01 | End: 2022-01-01 | Stop reason: HOSPADM

## 2022-01-01 RX ORDER — PROCHLORPERAZINE 25 MG
12.5 SUPPOSITORY, RECTAL RECTAL EVERY 12 HOURS PRN
Status: DISCONTINUED | OUTPATIENT
Start: 2022-01-01 | End: 2022-01-01 | Stop reason: HOSPADM

## 2022-01-01 RX ORDER — ONDANSETRON 4 MG/1
4 TABLET, ORALLY DISINTEGRATING ORAL EVERY 6 HOURS PRN
Status: DISCONTINUED | OUTPATIENT
Start: 2022-01-01 | End: 2022-01-01 | Stop reason: HOSPADM

## 2022-01-01 RX ORDER — NALOXONE HYDROCHLORIDE 0.4 MG/ML
0.1 INJECTION, SOLUTION INTRAMUSCULAR; INTRAVENOUS; SUBCUTANEOUS
Status: DISCONTINUED | OUTPATIENT
Start: 2022-01-01 | End: 2022-01-01 | Stop reason: HOSPADM

## 2022-01-01 RX ORDER — NALOXONE HYDROCHLORIDE 0.4 MG/ML
0.2 INJECTION, SOLUTION INTRAMUSCULAR; INTRAVENOUS; SUBCUTANEOUS
Status: DISCONTINUED | OUTPATIENT
Start: 2022-01-01 | End: 2022-01-01 | Stop reason: HOSPADM

## 2022-01-01 RX ORDER — HYDROMORPHONE HYDROCHLORIDE 1 MG/ML
.3-.5 INJECTION, SOLUTION INTRAMUSCULAR; INTRAVENOUS; SUBCUTANEOUS
Status: DISCONTINUED | OUTPATIENT
Start: 2022-01-01 | End: 2022-01-01 | Stop reason: HOSPADM

## 2022-01-01 RX ORDER — SCOLOPAMINE TRANSDERMAL SYSTEM 1 MG/1
1 PATCH, EXTENDED RELEASE TRANSDERMAL
Status: DISCONTINUED | OUTPATIENT
Start: 2022-01-01 | End: 2022-01-01 | Stop reason: HOSPADM

## 2022-01-01 RX ORDER — OXYCODONE HCL 20 MG/ML
10 CONCENTRATE, ORAL ORAL EVERY 6 HOURS
Status: DISCONTINUED | OUTPATIENT
Start: 2022-01-01 | End: 2022-01-01 | Stop reason: HOSPADM

## 2022-01-01 RX ORDER — ATROPINE SULFATE 10 MG/ML
2 SOLUTION/ DROPS OPHTHALMIC EVERY 4 HOURS PRN
Status: DISCONTINUED | OUTPATIENT
Start: 2022-01-01 | End: 2022-01-01 | Stop reason: HOSPADM

## 2022-01-01 RX ORDER — OXYCODONE HCL 20 MG/ML
5-10 CONCENTRATE, ORAL ORAL
Status: DISCONTINUED | OUTPATIENT
Start: 2022-01-01 | End: 2022-01-01 | Stop reason: HOSPADM

## 2022-01-01 RX ORDER — GLYCOPYRROLATE 0.2 MG/ML
0.2 INJECTION, SOLUTION INTRAMUSCULAR; INTRAVENOUS EVERY 4 HOURS
Status: COMPLETED | OUTPATIENT
Start: 2022-01-01 | End: 2022-01-01

## 2022-01-01 RX ORDER — ACETAMINOPHEN 500 MG
1000 TABLET ORAL EVERY 8 HOURS PRN
COMMUNITY

## 2022-01-01 RX ORDER — HYDROMORPHONE HYDROCHLORIDE 1 MG/ML
1-2 SOLUTION ORAL
Status: DISCONTINUED | OUTPATIENT
Start: 2022-01-01 | End: 2022-01-01 | Stop reason: ALTCHOICE

## 2022-01-01 RX ORDER — HALOPERIDOL 2 MG/ML
2 SOLUTION ORAL
Status: DISCONTINUED | OUTPATIENT
Start: 2022-01-01 | End: 2022-01-01 | Stop reason: HOSPADM

## 2022-01-01 RX ORDER — ACETAMINOPHEN 325 MG/1
650 TABLET ORAL EVERY 6 HOURS PRN
Status: DISCONTINUED | OUTPATIENT
Start: 2022-01-01 | End: 2022-01-01 | Stop reason: HOSPADM

## 2022-01-01 RX ORDER — AMOXICILLIN 250 MG
1 CAPSULE ORAL 2 TIMES DAILY
Status: DISCONTINUED | OUTPATIENT
Start: 2022-01-01 | End: 2022-01-01 | Stop reason: HOSPADM

## 2022-01-01 RX ORDER — PANTOPRAZOLE SODIUM 20 MG/1
40 TABLET, DELAYED RELEASE ORAL
Status: DISCONTINUED | OUTPATIENT
Start: 2022-01-01 | End: 2022-01-01 | Stop reason: ALTCHOICE

## 2022-01-01 RX ORDER — HYDROMORPHONE HYDROCHLORIDE 1 MG/ML
.3-.5 INJECTION, SOLUTION INTRAMUSCULAR; INTRAVENOUS; SUBCUTANEOUS
Status: DISCONTINUED | OUTPATIENT
Start: 2022-01-01 | End: 2022-01-01

## 2022-01-01 RX ORDER — LORAZEPAM 2 MG/ML
1 INJECTION INTRAMUSCULAR
Status: DISCONTINUED | OUTPATIENT
Start: 2022-01-01 | End: 2022-01-01

## 2022-01-01 RX ORDER — HYDROMORPHONE HYDROCHLORIDE 1 MG/ML
1-2 SOLUTION ORAL
Status: DISCONTINUED | OUTPATIENT
Start: 2022-01-01 | End: 2022-01-01

## 2022-01-01 RX ORDER — ACETAMINOPHEN 650 MG/1
650 SUPPOSITORY RECTAL EVERY 6 HOURS PRN
Status: DISCONTINUED | OUTPATIENT
Start: 2022-01-01 | End: 2022-01-01 | Stop reason: HOSPADM

## 2022-01-01 RX ORDER — LORAZEPAM 2 MG/ML
0.5 CONCENTRATE ORAL EVERY 6 HOURS
Status: DISCONTINUED | OUTPATIENT
Start: 2022-01-01 | End: 2022-01-01 | Stop reason: HOSPADM

## 2022-01-01 RX ORDER — LORAZEPAM 1 MG/1
1 TABLET ORAL
Status: DISCONTINUED | OUTPATIENT
Start: 2022-01-01 | End: 2022-01-01

## 2022-01-01 RX ORDER — FENTANYL CITRATE 50 UG/ML
25 INJECTION, SOLUTION INTRAMUSCULAR; INTRAVENOUS
Status: DISCONTINUED | OUTPATIENT
Start: 2022-01-01 | End: 2022-01-01

## 2022-01-01 RX ORDER — FUROSEMIDE 40 MG
40 TABLET ORAL DAILY
COMMUNITY
Start: 2022-01-01

## 2022-01-01 RX ORDER — ASPIRIN 81 MG/1
81 TABLET ORAL DAILY
COMMUNITY

## 2022-01-01 RX ORDER — LOSARTAN POTASSIUM 25 MG/1
25 TABLET ORAL DAILY
COMMUNITY
Start: 2022-01-01

## 2022-01-01 RX ORDER — ONDANSETRON 2 MG/ML
4 INJECTION INTRAMUSCULAR; INTRAVENOUS EVERY 6 HOURS PRN
Status: DISCONTINUED | OUTPATIENT
Start: 2022-01-01 | End: 2022-01-01 | Stop reason: HOSPADM

## 2022-01-01 RX ORDER — LEVOTHYROXINE SODIUM 88 UG/1
88 TABLET ORAL DAILY
COMMUNITY
Start: 2022-01-01

## 2022-01-01 RX ORDER — PROCHLORPERAZINE MALEATE 5 MG
5 TABLET ORAL EVERY 6 HOURS PRN
Status: DISCONTINUED | OUTPATIENT
Start: 2022-01-01 | End: 2022-01-01 | Stop reason: HOSPADM

## 2022-01-01 RX ORDER — BISACODYL 10 MG
10 SUPPOSITORY, RECTAL RECTAL
Status: DISCONTINUED | OUTPATIENT
Start: 2022-07-16 | End: 2022-01-01 | Stop reason: HOSPADM

## 2022-01-01 RX ORDER — LANOLIN ALCOHOL/MO/W.PET/CERES
1000 CREAM (GRAM) TOPICAL DAILY
COMMUNITY

## 2022-01-01 RX ORDER — LORAZEPAM 1 MG/1
1 TABLET ORAL
Status: DISCONTINUED | OUTPATIENT
Start: 2022-01-01 | End: 2022-01-01 | Stop reason: HOSPADM

## 2022-01-01 RX ORDER — OMEGA-3 FATTY ACIDS/FISH OIL 300-1000MG
200-400 CAPSULE ORAL EVERY 6 HOURS PRN
COMMUNITY

## 2022-01-01 RX ORDER — PIPERACILLIN SODIUM, TAZOBACTAM SODIUM 3; .375 G/15ML; G/15ML
3.38 INJECTION, POWDER, LYOPHILIZED, FOR SOLUTION INTRAVENOUS ONCE
Status: DISCONTINUED | OUTPATIENT
Start: 2022-01-01 | End: 2022-01-01

## 2022-01-01 RX ADMIN — FENTANYL CITRATE 25 MCG: 50 INJECTION, SOLUTION INTRAMUSCULAR; INTRAVENOUS at 13:20

## 2022-01-01 RX ADMIN — HYDROMORPHONE HYDROCHLORIDE 0.5 MG: 1 INJECTION, SOLUTION INTRAMUSCULAR; INTRAVENOUS; SUBCUTANEOUS at 01:20

## 2022-01-01 RX ADMIN — HYDROMORPHONE HYDROCHLORIDE 0.5 MG: 1 INJECTION, SOLUTION INTRAMUSCULAR; INTRAVENOUS; SUBCUTANEOUS at 03:12

## 2022-01-01 RX ADMIN — GLYCOPYRROLATE 0.2 MG: 0.2 INJECTION INTRAMUSCULAR; INTRAVENOUS at 23:42

## 2022-01-01 RX ADMIN — ATROPINE SULFATE 2 DROP: 10 SOLUTION/ DROPS OPHTHALMIC at 09:10

## 2022-01-01 RX ADMIN — GLYCOPYRROLATE 0.2 MG: 0.2 INJECTION INTRAMUSCULAR; INTRAVENOUS at 09:40

## 2022-01-01 RX ADMIN — ATROPINE SULFATE 2 DROP: 10 SOLUTION/ DROPS OPHTHALMIC at 13:58

## 2022-01-01 RX ADMIN — LORAZEPAM 0.5 MG: 2 LIQUID ORAL at 09:43

## 2022-01-01 RX ADMIN — HYDROMORPHONE HYDROCHLORIDE 1 MG: 5 SOLUTION ORAL at 19:17

## 2022-01-01 RX ADMIN — HYDROMORPHONE HYDROCHLORIDE 1 MG: 5 SOLUTION ORAL at 16:54

## 2022-01-01 RX ADMIN — LORAZEPAM 1 MG: 1 TABLET ORAL at 17:15

## 2022-01-01 RX ADMIN — GLYCOPYRROLATE 0.2 MG: 0.2 INJECTION INTRAMUSCULAR; INTRAVENOUS at 16:42

## 2022-01-01 RX ADMIN — HYDROMORPHONE HYDROCHLORIDE 0.5 MG: 1 INJECTION, SOLUTION INTRAMUSCULAR; INTRAVENOUS; SUBCUTANEOUS at 06:53

## 2022-01-01 RX ADMIN — GLYCOPYRROLATE 0.2 MG: 0.2 INJECTION INTRAMUSCULAR; INTRAVENOUS at 20:35

## 2022-01-01 RX ADMIN — HYDROMORPHONE HYDROCHLORIDE 0.5 MG: 1 INJECTION, SOLUTION INTRAMUSCULAR; INTRAVENOUS; SUBCUTANEOUS at 21:20

## 2022-01-01 RX ADMIN — SCOPALAMINE 1 PATCH: 1 PATCH, EXTENDED RELEASE TRANSDERMAL at 11:58

## 2022-01-01 RX ADMIN — HYDROMORPHONE HYDROCHLORIDE 0.5 MG: 1 INJECTION, SOLUTION INTRAMUSCULAR; INTRAVENOUS; SUBCUTANEOUS at 23:42

## 2022-01-01 RX ADMIN — SODIUM CHLORIDE 1000 ML: 9 INJECTION, SOLUTION INTRAVENOUS at 13:07

## 2022-01-01 RX ADMIN — HYDROMORPHONE HYDROCHLORIDE 0.5 MG: 1 INJECTION, SOLUTION INTRAMUSCULAR; INTRAVENOUS; SUBCUTANEOUS at 05:32

## 2022-01-01 RX ADMIN — ATROPINE SULFATE 2 DROP: 10 SOLUTION/ DROPS OPHTHALMIC at 14:50

## 2022-01-01 RX ADMIN — HYDROMORPHONE HYDROCHLORIDE 0.3 MG: 1 INJECTION, SOLUTION INTRAMUSCULAR; INTRAVENOUS; SUBCUTANEOUS at 20:33

## 2022-01-01 RX ADMIN — HYDROMORPHONE HYDROCHLORIDE 0.5 MG: 1 INJECTION, SOLUTION INTRAMUSCULAR; INTRAVENOUS; SUBCUTANEOUS at 17:14

## 2022-01-01 RX ADMIN — HALOPERIDOL 2 MG: 2 SOLUTION ORAL at 23:55

## 2022-01-01 RX ADMIN — GLYCOPYRROLATE 0.2 MG: 0.2 INJECTION INTRAMUSCULAR; INTRAVENOUS at 03:59

## 2022-01-01 RX ADMIN — LORAZEPAM 1 MG: 2 INJECTION INTRAMUSCULAR; INTRAVENOUS at 03:58

## 2022-01-01 RX ADMIN — FENTANYL CITRATE 25 MCG: 50 INJECTION, SOLUTION INTRAMUSCULAR; INTRAVENOUS at 13:18

## 2022-01-01 RX ADMIN — OXYCODONE HYDROCHLORIDE 10 MG: 100 SOLUTION ORAL at 16:10

## 2022-01-01 RX ADMIN — HYDROMORPHONE HYDROCHLORIDE 0.5 MG: 1 INJECTION, SOLUTION INTRAMUSCULAR; INTRAVENOUS; SUBCUTANEOUS at 11:10

## 2022-01-01 RX ADMIN — HYDROMORPHONE HYDROCHLORIDE 0.5 MG: 1 INJECTION, SOLUTION INTRAMUSCULAR; INTRAVENOUS; SUBCUTANEOUS at 10:34

## 2022-01-01 RX ADMIN — OXYCODONE HYDROCHLORIDE 10 MG: 100 SOLUTION ORAL at 09:44

## 2022-01-01 RX ADMIN — HYDROMORPHONE HYDROCHLORIDE 0.5 MG: 1 INJECTION, SOLUTION INTRAMUSCULAR; INTRAVENOUS; SUBCUTANEOUS at 03:58

## 2022-01-01 RX ADMIN — LORAZEPAM 1 MG: 2 INJECTION INTRAMUSCULAR; INTRAVENOUS at 01:09

## 2022-01-01 RX ADMIN — ATROPINE SULFATE 2 DROP: 10 SOLUTION/ DROPS OPHTHALMIC at 00:22

## 2022-01-01 RX ADMIN — HYDROMORPHONE HYDROCHLORIDE 0.5 MG: 1 INJECTION, SOLUTION INTRAMUSCULAR; INTRAVENOUS; SUBCUTANEOUS at 00:05

## 2022-01-01 RX ADMIN — HYDROMORPHONE HYDROCHLORIDE 0.5 MG: 1 INJECTION, SOLUTION INTRAMUSCULAR; INTRAVENOUS; SUBCUTANEOUS at 04:27

## 2022-01-01 RX ADMIN — ATROPINE SULFATE 2 DROP: 10 SOLUTION/ DROPS OPHTHALMIC at 04:23

## 2022-01-01 RX ADMIN — HYDROMORPHONE HYDROCHLORIDE 0.5 MG: 1 INJECTION, SOLUTION INTRAMUSCULAR; INTRAVENOUS; SUBCUTANEOUS at 19:39

## 2022-01-01 RX ADMIN — LORAZEPAM 0.5 MG: 2 LIQUID ORAL at 16:10

## 2022-01-01 RX ADMIN — GLYCOPYRROLATE 0.2 MG: 0.2 INJECTION INTRAMUSCULAR; INTRAVENOUS at 12:51

## 2022-01-01 RX ADMIN — HYDROMORPHONE HYDROCHLORIDE 0.5 MG: 1 INJECTION, SOLUTION INTRAMUSCULAR; INTRAVENOUS; SUBCUTANEOUS at 02:05

## 2022-01-01 ASSESSMENT — ACTIVITIES OF DAILY LIVING (ADL)
ADLS_ACUITY_SCORE: 47
ADLS_ACUITY_SCORE: 49
ADLS_ACUITY_SCORE: 47
ADLS_ACUITY_SCORE: 45
ADLS_ACUITY_SCORE: 47
ADLS_ACUITY_SCORE: 41
ADLS_ACUITY_SCORE: 47
ADLS_ACUITY_SCORE: 45
ADLS_ACUITY_SCORE: 45
ADLS_ACUITY_SCORE: 47
ADLS_ACUITY_SCORE: 35
ADLS_ACUITY_SCORE: 45
ADLS_ACUITY_SCORE: 47
ADLS_ACUITY_SCORE: 45
WEAR_GLASSES_OR_BLIND: NO
ADLS_ACUITY_SCORE: 47
ADLS_ACUITY_SCORE: 45
ADLS_ACUITY_SCORE: 49
ADLS_ACUITY_SCORE: 35
ADLS_ACUITY_SCORE: 47
ADLS_ACUITY_SCORE: 45

## 2022-01-01 ASSESSMENT — ENCOUNTER SYMPTOMS
SHORTNESS OF BREATH: 1
ABDOMINAL PAIN: 1
APPETITE CHANGE: 1
ACTIVITY CHANGE: 1

## 2022-07-13 PROBLEM — R91.8 LUNG MASS: Status: ACTIVE | Noted: 2022-01-01

## 2022-07-13 PROBLEM — J96.01 ACUTE RESPIRATORY FAILURE WITH HYPOXIA (H): Status: ACTIVE | Noted: 2022-01-01

## 2022-07-13 PROBLEM — J93.9 PNEUMOTHORAX ON RIGHT: Status: ACTIVE | Noted: 2022-01-01

## 2022-07-13 PROBLEM — N17.9 ACUTE RENAL FAILURE, UNSPECIFIED ACUTE RENAL FAILURE TYPE (H): Status: ACTIVE | Noted: 2022-01-01

## 2022-07-13 NOTE — ED NOTES
O2 dropped to mid 80's.  Up O2 to 6 liters which brought up to 88%.  Placed on oxymask at 10 l and ED provider notified

## 2022-07-13 NOTE — CONSULTS
"[facility] - Palliative Care Consultation Note    Patient: Alix Horan  Date of Admission:  7/13/2022    Requesting Clinician / Team: Dr Greenwood  Reason for consult: Goals of Care (pt wants to transition to comfort care)    Summary/Recommendations:    Goals of care  Spoke with Hospitalist prior to seeing pt to understand how best palliative care can assist as pt has had discussions with ER MD this afternoon, re: plan of care/goals (comfort). Hospitalist indicates would appreciate Palliative Care involvement and support for pt.     Saw Alix in the ER. She is resting comfortably, a little drowsy (had fentanyl this afternoon with chest tube procedure). She is able to answer questions, slowly. She is breathing comfortably (on face mask 15 liters, RR 28-30, sat 90-91%). She has some occasional right sided chest pain (where chest tube is), describes as moderate.     Reviewed her understanding of important conversations she has had with MDs today. She is aware she has a lung mass and fluid in the lung and this may be cancer. She had indicated to the ER MD she was \"done\" and did not want more evaluation, wanted to focus on comfort.     Met pt this afternoon and she confirms the above. When asked what she would value, if time were short, she would like to see her two remaining brothers Cresencio and Adrian. She has is Shinto and has  Seen a  recently but would welcome spiritual care connecting with her. Left a message for her brother Cresencio re: the above and encouraged him to call the ER for updates. Did leave the Palliative Care number, indicating answered 8-4. Hopefully, Alix is able to get admitted into a hospital room and see her brothers before she passes. She is aware that it is a little uncertain what the next day or so will look like as far as decline, vs any medical stabilization.     Advanced care planning  -Previous Healthcare Directive: Not on file electronically. Per chart review, brother Cresencio is " her POA.   -Surrogate Medical Decision Maker: Identified her brother Cresencio as her primary health care agent  -CODE STATUS: DNR/DNI    Symptom management  Dyspnea, 2nd lung mass, pneumothorax, pleural effusion. Currently on 15 L face mask. Indicates she is doing ok, with her breathing. Is ok to have chest tube remain yet, at this time, if it is going to help her breathing. Reviewed medications for dyspnea and pain. She appreciates knowing she has these. She is ok if the medications make her sleepy. Cr 4.64.  -Started comfort care this afternoon. Agree with Dilaudid for dyspnea and pain, given kidney function.    -Agree with Pulmonology Consult to review chest tube, ? placement, best plan of care (message sent to MD re: timing of Pulm consult, ? prompt review of chest tube status/location; review ER MD note indicates she was aware, believes it is correctly located).    Pain, right side where chest tube is, describes as intermittent, moderate. Does not have significant chronic pain at home.    -Started comfort care this afternoon. Agree with Dilaudid for dyspnea and pain, given kidney function.      Psychosocial and support needs  -Spiritual care consult  -LVM for brother Cresencio, relaying her wish to see her brothers Cresencio and Adrian, in the setting of end of life.     Case discussed with MD, RN.      Thank you for the opportunity to participate in the care of this patient and family.      During regular M-F work hours -- if you are not sure who specifically to contact -- please contact us by calling [phone].      Palliative Care Assessment    Information gathered today from: chart review, MD, RN.  Alix Horan is a 89 year old female with a history of   She has history of breast cancer, hypertension, hyperlipidemia.  who presented to the ED on July 13th with altered mental status, generalized weakness. Admitted to the hospital with lung mass and DAVID.   Previous hospitalizations: None recently  Recent changes: Per ER, pt  "has had a steady decline over the past 3 to 4 weeks, acutely worse overnight. Tachycardic and hypotensive.  Patient is hypoxic on arrival.  She is tachypneic.  She has pitting edema. She is ill-appearing.  She is able to tell me her name, birthdate and that she is at the hospital.  She endorses that she is DNR/DNI.  Labs show acute renal failure.  Chest x-ray shows a right-sided pneumothorax with a fluid collection and likely mass pushing on the mediastinum.  Patient was consented for a pigtail chest catheter.      Today, the patient was seen for: \"Patient wants to transition to comfort care\"    ER Notes from today:  11:50 AM I met the patient and performed my initial interview and exam.  12:09 PM I talked with patient's family.  12:17 PM I talked with the patient who confirms she is DNR/DNI.  1:01 PM I received a call from radiology, patient's CT shows evidence of pneumothorax.  1:06 PM I talked with the patient and prepared her for the procedure.  1:11 PM I placed the chest tube.  1:55 PM called back to the room.  Patient tells me she is done.  She is awake alert and oriented.  She tells me she does not want any further testing or blood draws or medications.  She denies any persistent pain.  We did discuss transitioning her to comfort cares and that she will likely pass and she is agreeable  2:00 PM I discussed with patient's brother, Cresencio.  He is her power of .  He indicates this would be her wish if there is no reasonable chance of maintaining her current status that she would want to be on comfort cares.  He plans to come to the hospital tomorrow and I did discuss that she may pass overnight and he understands    Summary of Palliative Encounter:  I  discussed the reason for Palliative Care Referral and our role in symptom management, patient family communication and understanding their choices for medical treatment and providing  guidance in making difficult decisions in the framework of focusing on " patient comfort and quality of life.    Reviewed current conditions and treatments including ER evaluation today showing lung mass, pleural fluid, possible malignancy, conversations with MDs re: wanting to not have further evaluation and to focus on comfort.        Prognosis, Goals, and/or Advance Care Planning were addressed today: Yes    Mood, coping, and/or meaning in the context of serious illness were addressed today: Yes    Functional Status: Unable to assess today 2nd pt somnolence, focus on other priority assessments. Pt has reportedly lived independently.     Prognosis, Goals, & Planning:   Prognosis (quality & quantity): Short  High risk of death within one year?Yes    Patient's decision making preferences: Identifies her brother Cresencio as her primary health care agent.         Capacity evaluation:    Pt Alix seems to have capacity to make a decision regarding her medical care, although she was brought to the ER for weakness and altered mental status.             I have concerns about the patient/family's health literacy today: Believe she understands           Patient has a completed Health Care Directive:      Code status: DNR/DNI    Social: Unable to gather much social history today.     Spirituality: Yarsanism      Key Palliative Symptom Data:  SOB-Doing ok, on 15 L face mask  Pain-Moderate at times, right chest  Nausea-No  Anxiety-Doing ok    ROS:  Comprehensive ROS is reviewed and is negative except as here & per HPI:      Past Medical History:  No past medical history on file.     Past Surgical History:  Past Surgical History:   Procedure Laterality Date     BIOPSY BREAST       LUMPECTOMY BREAST Right 2006     OPEN REDUCTION INTERNAL FIXATION RODDING INTRAMEDULLARY TIBIA Right 2/26/2015    Procedure: RIGHT TIBIAL PLATEAU FRACTURE OPEN REDUCTION INTERNAL FIXATION (RM 3114-1);  Surgeon: Matty Gagnon MD;  Location: [facility];  Service:          Family History:  Family History   Problem  "Relation Age of Onset     Ovarian Cancer Sister      Uterine Cancer Sister      Prostate Cancer Brother          Allergies:  Allergies   Allergen Reactions     Influenza Virus Vaccines [Influenza Vaccines] Unknown     \"flu like symptoms\"        Medications:  I have reviewed this patient's medication profile and medications from this hospitalization.       piperacillin-tazobactam  3.375 g Intravenous Once        PRN MEDS:   fentaNYL, HYDROmorphone (STANDARD CONC) **OR** HYDROmorphone, HYDROmorphone, LORazepam **OR** LORazepam, naloxone, naloxone     Morphine Equivalent Daily Dose: Fentanyl 50 mcq    Physical Exam:  BP (!) 140/73   Pulse 96   Temp 96.8  F (36  C) (Temporal)   Resp (!) 45   Wt 84.6 kg (186 lb 9.6 oz)   SpO2 91%   BMI 35.26 kg/m        General Appearance: Sleepy but opens eyes and can interact when asked questions.    Resp: Clear in the upper anterior lungs  CV:   Abdomen: Soft, nontender, bowel sounds active all four quadrants,   Extremities + pedal edema.   Skin: Warm and dry, no rashes.     Data reviewed:    Data   EXAM: XR CHEST PORT 1 VIEW  LOCATION: [facility]  DATE/TIME: 7/13/2022 12:40 PM     INDICATION: Shortness of breath  COMPARISON: 5/29/2007.                                                                      IMPRESSION: There is a moderate right pneumothorax distance between the visceral and parietal pleura at the right apex is approximately 3.5 cm. There is opacity over the lower two thirds the right hemithorax consistent with a combination of pleural effusion and the mass. There is shift of the heart and mediastinum to the left despite the right pneumothorax. The left lung is clear.     NOTE: ABNORMAL REPORT     THE DICTATION ABOVE DESCRIBES AN ABNORMALITY FOR WHICH FOLLOW-UP IS NEEDED.  [Critical Result: Right pneumothorax with possible right lung mass]     Finding was identified on 7/13/2022 12:52 PM.      Dr. Marianne Greenwood was contacted by me on " 7/13/2022 12:56 PM and verbalized understanding of the critical result.      Component   Radiologist flags  Panic    Right pneumothorax with possible right lung mass      EXAM: XR CHEST PORT 1 VIEW  LOCATION: [facility]  DATE/TIME: 7/13/2022 1:32 PM     INDICATION: Shortness of breath  COMPARISON: 07/13/2022 12:43 PM                                                                      IMPRESSION: A radiopaque tube projects over the right chest. There is persistent partial collapse of the right lung, with pleural fluid surrounding the lung. Therefore, the tube may be malpositioned, or plugged. Also possible is trapped lung with redistribution of the pleural fluid. There appears to be right inferior lung consolidation or mass. There is mediastinal shift leftward. Stable heart size. Left lung appears clear.      Lab Results   Component Value Date/Time    ALBUMIN 2.1 (L) 07/13/2022 12:19 PM     Wt Readings from Last 3 Encounters:   07/13/22 84.6 kg (186 lb 9.6 oz)   02/25/15 65.8 kg (145 lb)         TTS: I have personally spent a total of 55 minutes  today on the unit in review of medical record, consultation with the medical providers and assessment of patient today, with more than 50% of this time spent in counseling, coordination of care, and conversation with pt, MD, RN (and LVM for brother) re: pts conversations with other medical providers today re: diagnostic test results, prognosis, goals of care (said was 'done' to other providers, wanted to focus on comfort), symptom management, emotional support, risks and benefits of management options, and development of plan of care.     SUSANNE Alvarez, FNP-BC, PMHNP-BC  Palliative Care Nurse Practitioner  [facility]  [phone]      During regular M-F work hours -- if you are not sure who specifically to contact -- please contact us by calling [phone].

## 2022-07-13 NOTE — H&P
[facility] MEDICINE ADMISSION HISTORY AND PHYSICAL   PCP:Yumiko Atkins, Cate    Assessment & Plan    Alix Horan is a 89 year old old female with history of hypertension, hypothyroidism, dyslipidemia, breast cancer status post right breast lumpectomy and radiation treatment, diagnosed with right-sided pneumothorax with pleural effusion and right lung mass near mediastinum.  Patient is status post pigtail chest catheter placed by ED provider.  Patient is requesting no further testing, blood draws or medication for treatment of chest findings or other medical conditions.  She is requesting comfort care.  Patient is admitted to the hospital for hospice and palliative care consultation.    Palliative care encounter  Right chest mass, pneumothorax and pleural effusion likely secondary to malignant effusion.  Acute respiratory failure with hypoxia  Status post chest tube placement in ED.  Palliative care consultation.  Spoke with Fang Houston from palliative care who will see patient later today.  Hospice care consultation.  Will discuss with Dr. Greenwood about chest tube removal prior to admission.   Order comfort care order set, including medication including ondansetron, prochlorperazine for nausea vomiting, atropine for secretions, bisacodyl senna docusate for constipation, Dilaudid 1 to 2 mg oral every 2 hours as needed and 0.3 to 0.5 mg IV every 15 minutes as needed for severe pain or shortness of breath.  Pulmonology consultation for chest tube management.       Acute kidney injury with hyperkalemia  Metabolic acidosis  Lactic acidosis  Hyponatremia.  Likely secondary to prerenal azotemia/dehydration from decreased oral intake.  Lactic acid 3.7  Creatinine 4.64 baseline 1.5 mg/dL.    Normocytic anemia  Hemoglobin 9.9 g/dL    Hyperlipidemia  Hypothyroidism  Essential hypertension  Gastrosoft reflux disease    DVTP: Defer VTE pharmaceutical prophylaxis due to comfort care status.   Code  Status: Do not intubate, Do not resuscitate.   Disposition: inpatient admission for acute respiratory failure hypoxia, right chest mass, pneumothorax and pleural effusion, appears excudative, hospice and palliative care consultation.     Chief Complaint  shortness of breath, reported altered mental status.     History of Present Illness:     Alix Horan is a 89 year old old female with history of hypertension, hypothyroidism, dyslipidemia, breast cancer status post right breast lumpectomy and radiation treatment, who was brought by EMS from her independent living facility for evaluation of shortness of breath and reported altered mental status.  According to EMS patient reportedly has been acting differently by care staff over the past 3 to 4 weeks.  She has decreased appetite and anorexia during this time.  She was noted to have low blood pressure and tachycardia by care providers on 7/13/2022.  EMS measured normal vitals.  She was placed on 15 L/min via oxygen mask due to shortness of breath.  ED provider did speak with patient and brother who agree that patient would wish for DO NOT INTUBATE and DO NOT RESUSCITATE status and they are both agreeable to comfort care approach.    In the emergency room patient's vital signs remarkable for tachypnea with respirations between 30 and 45 breaths/min.  Patient SPO2 range between 89 and 93% on 15 L/min via oxime mask.  Laboratory findings revealed elevated creatinine of 4.6 mg/dL, lactic acid elevation of 3.7, elevation of potassium at 5.3 and low sodium of 129.  TSH was elevated at 36.69.  Hemoglobin was low at 9.9 g/dL.  Patient had mild coagulopathy with INR of 1.29.  Chest x-ray revealed moderate right pneumothorax, and opacity over lower two thirds of right hemithorax consistent with combination of pleural effusion and mass.  There was shift of the heart mediastinum to the left.  Left lung was clear.  Repeat chest x-ray after chest tube placement with persistent  "collapse of right lung likely trapped lung.    Patient was admitted for comfort care, hospice and palliative care consultation.    Past Medical History     No past medical history on file.     Surgical History     Past Surgical History:   Procedure Laterality Date     BIOPSY BREAST       LUMPECTOMY BREAST Right 2006     OPEN REDUCTION INTERNAL FIXATION RODDING INTRAMEDULLARY TIBIA Right 2/26/2015    Procedure: RIGHT TIBIAL PLATEAU FRACTURE OPEN REDUCTION INTERNAL FIXATION (RM 3114-1);  Surgeon: Matty Gagnon MD;  Location: [facility] OR;  Service:      Family History    Reviewed, and   Family History   Problem Relation Age of Onset     Ovarian Cancer Sister      Uterine Cancer Sister      Prostate Cancer Brother      Patient's family history was reviewed and is non-contributory to presenting complaint.     Social History      Social History     Tobacco Use     Smoking status: Never Smoker   Substance Use Topics     Alcohol use: No      Allergies     Allergies   Allergen Reactions     Influenza Virus Vaccines [Influenza Vaccines] Unknown     \"flu like symptoms\"     Prior to Admission Medications      Prior to Admission Medications   Prescriptions Last Dose Informant Patient Reported? Taking?   ascorbic acid (ASCORBIC ACID WITH THEODORA HIPS) 500 MG tablet   Yes No   Sig: [ASCORBIC ACID (ASCORBIC ACID WITH THEODORA HIPS) 500 MG TABLET] Take 500 mg by mouth daily.   aspirin 325 MG tablet   No No   Sig: [ASPIRIN 325 MG TABLET] Take one 325mg tab daily with food for 42 days. Then resume aspirin 81mg daily   atorvastatin (LIPITOR) 10 MG tablet   Yes No   Sig: [ATORVASTATIN (LIPITOR) 10 MG TABLET] Take 10 mg by mouth bedtime.   calcium-vitamin D (CALCIUM-VITAMIN D) 500 mg(1,250mg) -200 unit per tablet   Yes No   Sig: [CALCIUM-VITAMIN D (CALCIUM-VITAMIN D) 500 MG(1,250MG) -200 UNIT PER TABLET] Take 1 tablet by mouth daily with breakfast.   cholecalciferol, vitamin D3, 2,000 unit Tab   Yes No   Sig: [CHOLECALCIFEROL, VITAMIN " D3, 2,000 UNIT TAB] Take 2,000 Units by mouth every morning.   ferrous sulfate 65 mg elemental iron   Yes No   Sig: [FERROUS SULFATE 65 MG ELEMENTAL IRON] Take 1 tablet by mouth daily with breakfast.   levothyroxine (SYNTHROID, LEVOTHROID) 75 MCG tablet   Yes No   Sig: [LEVOTHYROXINE (SYNTHROID, LEVOTHROID) 75 MCG TABLET] Take 75 mcg by mouth daily.   multivitamin therapeutic (THERAGRAN) tablet   Yes No   Sig: [MULTIVITAMIN THERAPEUTIC (THERAGRAN) TABLET] Take 1 tablet by mouth daily.   omeprazole (PRILOSEC) 20 MG capsule   Yes No   Sig: [OMEPRAZOLE (PRILOSEC) 20 MG CAPSULE] Take 20 mg by mouth daily.   triamterene-hydrochlorothiazide (MAXZIDE-25) 37.5-25 mg per tablet   Yes No   Sig: [TRIAMTERENE-HYDROCHLOROTHIAZIDE (MAXZIDE-25) 37.5-25 MG PER TABLET] Take 1 tablet by mouth every other day.       Facility-Administered Medications: None      Review of Systems   A 12 point comprehensive review of systems was negative except as noted above in HPI.    PHYSICAL EXAMINATION   Temp:  [96.8  F (36  C)] 96.8  F (36  C)  Pulse:  [96-98] 96  Resp:  [31-45] 45  BP: (140)/(73-92) 140/73  SpO2:  [89 %-93 %] 91 %  Weight:   Body mass index is 35.26 kg/m .    General Appearance:  Alert, cooperative, no distress, appears stated age   Head:  Normocephalic, without obvious abnormality, atraumatic   Eyes:  PERRL, conjunctiva/corneas clear, EOM's intact   Throat: Lips, mucosa, and tongue normal; teeth and gums normal   Neck: Supple, symmetrical, trachea midline, no adenopathy, thyroid: not enlarged, symmetric, no carotid bruit or JVD   Back:   Symmetric, no curvature, ROM normal, no CVA tenderness   Lungs:   Clear to auscultation bilaterally, respirations unlabored   Chest Wall:  No tenderness or deformity   Heart:  Regular rate and rhythm, S1, S2 normal,no murmur, rub or gallop   Abdomen:   Soft, non-tender, bowel sounds active all four quadrants,  no masses, no organomegaly   Extremities: Extremities normal, atraumatic, no cyanosis  or edema   Skin: Skin color, texture, turgor normal, no rashes or ulcers noted over visible skin areas.    Neurologic: Alert and oriented X 3, Moves all 4 extremities     Results:  Recent Labs   Lab Test 07/13/22  1219   *   POTASSIUM 5.3*   CHLORIDE 96*   CO2 21*   *   BUN 51*   CR 4.64*   GFRESTIMATED 8*   KIAH 8.6     Recent Labs   Lab Test 07/13/22  1219 06/16/22  1541 04/06/22  1044   CR 4.64* 1.50* 1.31*     Recent Labs   Lab Test 01/07/20  1149 08/14/19  1050   A1C 5.8 6.2*     Recent Labs   Lab Test 07/13/22  1219   WBC 9.5   HGB 9.9*   HCT 29.7*   MCV 92        Recent Labs   Lab Test 07/13/22  1219   HGB 9.9*     Recent Labs   Lab Test 07/13/22  1219   TROPONINI 0.01     No results for input(s): BNP, NTBNPI, NTBNP in the last 35590 hours.  Recent Labs   Lab Test 07/13/22  1219   INR 1.28*       EKG:  EKG: Sinus rhythm, nonspecific ST and T wave abnormality, when compared to February 2015 EKG no significant change was noted.    Imaging:   Radiology Results:   Recent Results (from the past 24 hour(s))   XR Chest Port 1 View   Result Value    Radiologist flags Right pneumothorax with possible right lung mass (AA)    Narrative    EXAM: XR CHEST PORT 1 VIEW  LOCATION: [facility]  DATE/TIME: 7/13/2022 12:40 PM    INDICATION: Shortness of breath  COMPARISON: 5/29/2007.      Impression    IMPRESSION: There is a moderate right pneumothorax distance between the visceral and parietal pleura at the right apex is approximately 3.5 cm. There is opacity over the lower two thirds the right hemithorax consistent with a combination of pleural   effusion and the mass. There is shift of the heart and mediastinum to the left despite the right pneumothorax. The left lung is clear.    NOTE: ABNORMAL REPORT    THE DICTATION ABOVE DESCRIBES AN ABNORMALITY FOR WHICH FOLLOW-UP IS NEEDED.  [Critical Result: Right pneumothorax with possible right lung mass]    Finding was identified on  7/13/2022 12:52 PM.     Dr. Marianne Greenwood was contacted by me on 7/13/2022 12:56 PM and verbalized understanding of the critical result.    XR Chest Port 1 View    Narrative    EXAM: XR CHEST PORT 1 VIEW  LOCATION: [facility]  DATE/TIME: 7/13/2022 1:32 PM    INDICATION: Shortness of breath  COMPARISON: 07/13/2022 12:43 PM      Impression    IMPRESSION: A radiopaque tube projects over the right chest. There is persistent partial collapse of the right lung, with pleural fluid surrounding the lung. Therefore, the tube may be malpositioned, or plugged. Also possible is trapped lung with   redistribution of the pleural fluid. There appears to be right inferior lung consolidation or mass.    There is mediastinal shift leftward. Stable heart size. Left lung appears clear.       Total time spent was estimated at 75 minutes.  Greater than 35 minutes was spent in direct evaluation of patient.  Rest of time was spent in coordination of care.     Reyes Vaughn DO, [facility] Service  Internal Medicine    7/13/2022  2:38 PM

## 2022-07-13 NOTE — PHARMACY-ADMISSION MEDICATION HISTORY
Pharmacy Note - Admission Medication History    Pertinent Provider Information:     [facility]; tried contacting for last doses - unable to get this information. Provided with list of medications but not MAR.      ______________________________________________________________________    Prior To Admission (PTA) med list completed and updated in EMR.       PTA Med List   Medication Sig Last Dose     acetaminophen (TYLENOL) 500 MG tablet Take 1,000 mg by mouth every 8 hours as needed for pain Unknown at Unknown time     ascorbic acid (ASCORBIC ACID WITH THEODORA HIPS) 500 MG tablet [ASCORBIC ACID (ASCORBIC ACID WITH THEODORA HIPS) 500 MG TABLET] Take 500 mg by mouth daily. Unknown at Unknown time     aspirin 81 MG EC tablet Take 81 mg by mouth daily Unknown at Unknown time     atorvastatin (LIPITOR) 10 MG tablet [ATORVASTATIN (LIPITOR) 10 MG TABLET] Take 10 mg by mouth bedtime. Unknown at Unknown time     calcium carbonate 600 mg-vitamin D 400 units (CALTRATE) 600-400 MG-UNIT per tablet Take 1 tablet by mouth 2 times daily Unknown at Unknown time     cholecalciferol, vitamin D3, 2,000 unit Tab [CHOLECALCIFEROL, VITAMIN D3, 2,000 UNIT TAB] Take 2,000 Units by mouth every morning. Unknown at Unknown time     cyanocobalamin (VITAMIN B-12) 1000 MCG tablet Take 1,000 mcg by mouth daily Unknown at Unknown time     ferrous sulfate 65 mg elemental iron [FERROUS SULFATE 65 MG ELEMENTAL IRON] Take 1 tablet by mouth daily with breakfast. Unknown at Unknown time     furosemide (LASIX) 40 MG tablet Take 40 mg by mouth daily Unknown at Unknown time     ibuprofen (ADVIL/MOTRIN) 200 MG capsule Take 200-400 mg by mouth every 6 hours as needed for other (pain) Unknown at Unknown time     levothyroxine (SYNTHROID/LEVOTHROID) 88 MCG tablet Take 88 mcg by mouth daily Unknown at Unknown time     losartan (COZAAR) 25 MG tablet Take 25 mg by mouth daily Unknown at Unknown time     multivitamin therapeutic (THERAGRAN) tablet  [MULTIVITAMIN THERAPEUTIC (THERAGRAN) TABLET] Take 1 tablet by mouth daily. Unknown at Unknown time     omeprazole (PRILOSEC) 20 MG capsule Take 20 mg by mouth daily before breakfast Unknown at Unknown time       Information source(s): Facility ([facility] medication list/MAR - medication list, not MAR.   Method of interview communication: N/A    Summary of Changes to PTA Med List  New: furosemide, losartan, vitamin B12, acetaminophen, ibuprofen   Discontinued: triamterene-hydrochlorothiazide   Changed: aspirin 81 mg (not 325 mg), levothyroxine 88 mcg (not 75 mcg), calcium 600 mg + D 400 units instead of 500 mg + 200 units,     Patient was asked about OTC/herbal products specifically.  PTA med list reflects this.    In the past week, patient estimated taking medication this percent of the time:  greater than 90%.    Allergies were reviewed, assessed, and updated with the patient.      Patient does not use any multi-dose medications prior to admission.    The information provided in this note is only as accurate as the sources available at the time of the update(s).    Thank you for the opportunity to participate in the care of this patient.    Faye Coughlin [facility]  7/13/2022 3:11 PM

## 2022-07-13 NOTE — ED PROVIDER NOTES
EMERGENCY DEPARTMENT ENCOUNTER      NAME: Alix Horan  AGE: 89 year old female  YOB: 1931  MRN: [id number]  EVALUATION DATE & TIME: 7/13/2022 11:53 AM    PCP: Moisés Bennett    ED PROVIDER: Marianne Greenwood DO      Chief Complaint   Patient presents with     Shortness of Breath     Altered Mental Status         FINAL IMPRESSION:  1. Acute respiratory failure with hypoxia (H)    2. Pneumothorax on right    3. Lung mass    4. Acute renal failure, unspecified acute renal failure type (H)          ED COURSE & MEDICAL DECISION MAKING:    Pertinent Labs & Imaging studies reviewed. (See chart for details)  11:50 AM I met the patient and performed my initial interview and exam.  12:09 PM I talked with patient's family.  12:17 PM I talked with the patient who confirms she is DNR/DNI.  1:01 PM I received a patito from radiology, patient's Xray shows evidence of pneumothorax.  1:06 PM I talked with the patient and prepared her for the procedure.  1:11 PM I placed the chest tube.  1:55 PM called back to the room.  Patient tells me she is done.  She is awake alert and oriented.  She tells me she does not want any further testing or blood draws or medications.  She denies any persistent pain.  We did discuss transitioning her to comfort cares and that she will likely pass and she is agreeable  2:00 PM I discussed with patient's brother, Cresencio.  He is her power of .  He indicates this would be her wish if there is no reasonable chance of maintaining her current status that she would want to be on comfort cares.  He plans to come to the hospital tomorrow and I did discuss that she may pass overnight and he understands    Patient admitted to Dr. Vaughn.  Palliative care consult placed.  Care manager updated    89 year old female presents to the Emergency Department for evaluation of altered mental status, generalized weakness.  She has history of breast cancer, hypertension, hyperlipidemia.  Per report she  "lives in independent living.  Sounds like she has had a steady decline over the past 3 to 4 weeks.  Acutely worse overnight.  Tachycardic and hypotensive.  Patient is hypoxic on arrival.  She is tachypneic.  She has pitting edema.  She is ill-appearing.  She is able to tell me her name, birthdate and that she is at the hospital.  She endorses that she is DNR/DNI.  Labs show acute renal failure, lactic acidosis, mild hyperkalemia.  Chest x-ray shows a right-sided pneumothorax with a fluid collection and likely mass pushing on the mediastinum, shifting it to the left despite the right pneumothorax.  Patient was consented for a pigtail chest catheter.  This was placed using lidocaine and fentanyl for anesthesia.  Foul-smelling drainage, yellow color, return of 200 cc into the pleural VAC.  This continues to drain, therefore I feel it is in the proper position despite repeat x-ray questioning this.  Given concern for empyema and lactic acidosis, blood cultures and antibiotics ordered.  Plan for CT head/chest/abdomen/pelvis.  Oxygen saturation still only around 90% on facemask with wall oxygen turned to max, planned for high flow nasal cannula.  I was called back to the room as the patient states she is \"done\".  She requests to be kept comfortable.  She does not want any further testing.  I did discuss with her the results and likely that she would not fully recover from this.  She would like to be transition to comfort cares.  She understands this does mean transitioning to end-of-life.  She does not want any antibiotics or further treatment.  I did update her brothers via phone who are in agreement with this plan.  I left in the chest tube for now as she denies discomfort related to the tube and may help with her respiratory difficulties.  Comfort care orders placed. Patient reassessed and appears comfortable.  700 ml out of her chest tube.    At the conclusion of the encounter I discussed the results of all of the " tests and the disposition. The questions were answered. The patient or family acknowledged understanding and was agreeable with the care plan.       Critical Care     Performed by: Dr Daniela Greenwood  Authorized by: Dr Daniela Greenwood  Total critical care time: 40 minutes  Critical care was necessary to treat or prevent imminent or life-threatening deterioration of the following conditions: Respiratory failure  Critical care was time spent personally by me on the following activities: development of treatment plan with patient or surrogate, discussions with consultants, examination of patient, evaluation of patient's response to treatment, obtaining history from patient or surrogate, ordering and performing treatments and interventions, ordering and review of laboratory studies, ordering and review of radiographic studies, re-evaluation of patient's condition and monitoring for potential decompensation.  Critical care time was exclusive of separately billable procedures and treating other patients.    MEDICATIONS GIVEN IN THE EMERGENCY:  Medications   fentaNYL (PF) (SUBLIMAZE) injection 25 mcg (25 mcg Intravenous Given 7/13/22 1320)   piperacillin-tazobactam (ZOSYN) 3.375 g vial to attach to  mL bag (has no administration in time range)   naloxone (NARCAN) injection 0.1 mg (has no administration in time range)   naloxone (NARCAN) injection 0.2 mg (has no administration in time range)   HYDROmorphone (STANDARD CONC) (DILAUDID) oral solution 1-2 mg (has no administration in time range)     Or   HYDROmorphone (DILAUDID) half-tab 1-2 mg (has no administration in time range)   HYDROmorphone (PF) (DILAUDID) injection 0.3-0.5 mg (has no administration in time range)   LORazepam (ATIVAN) injection 1 mg (has no administration in time range)     Or   LORazepam (ATIVAN) tablet 1 mg (has no administration in time range)   0.9% sodium chloride BOLUS (1,000 mLs Intravenous New Bag 7/13/22 1307)       NEW PRESCRIPTIONS STARTED AT  "TODAY'S ER VISIT  New Prescriptions    No medications on file          =================================================================    HPI    Patient information was obtained from: EMS and Patient    Use of : N/A         Alix Horan is a 89 year old female with a pertinent history of breast cancer, hyperlipidemia, hypertension and CKD (stage III) who presents to this ED by EMS from living facility for evaluation of shortness of breath and altered mental status.    Per EMS, patient lives in a \"semi-independent\" facility where she has been acting \"odd\" for the past 3-4 weeks. In additions, patient has not been eating or drinking over this time frame either. Patient mentioned some abdominal pain to EMS, but has since not mentioned it. Staff has noted that patient has low blood pressure and tachycardia today, but EMS had normal vitals en route. They placed patient on 15 L of oxygen as she was having difficulty breathing. She is currently at 88-89% oxygen saturation and respiration rate of 38 with the non-breather. Staff were mostly concerned as today she made a rapid decline in mental status. Patient is typically talkative and able to move, but has only grunted and answered in short phrases. EMS states patient's paperwork shows patient is DNR. Staff also mentioned that family was discussing possible hospice care, but this has not been confirmed.     HISTORY LIMITED PER PATIENT SECONDARY TO AMS    Per patient, she states that she \"can't breath\". This has been an ongoing problem per patient, but got worse today. She endorses some right upper quadrant abdominal pain. No cough. When asked about a fever, she states 'not much'. She is alert to person and place.     Per brother, she has been declining over the last 3 weeks, however today seemed like a more rapid decline. He does not believe she has a legal DNR, but believes that patient would not want resuscitation.       REVIEW OF SYSTEMS   LIMITED " "SECONDARY TO AMS    Review of Systems   Constitutional: Positive for activity change (decreased) and appetite change (decreased).   Respiratory: Positive for shortness of breath.    Gastrointestinal: Positive for abdominal pain (RUQ).        PAST MEDICAL HISTORY:  No past medical history on file.    PAST SURGICAL HISTORY:  Past Surgical History:   Procedure Laterality Date     BIOPSY BREAST       LUMPECTOMY BREAST Right 2006     OPEN REDUCTION INTERNAL FIXATION RODDING INTRAMEDULLARY TIBIA Right 2/26/2015    Procedure: RIGHT TIBIAL PLATEAU FRACTURE OPEN REDUCTION INTERNAL FIXATION (RM 3114-1);  Surgeon: Matty Gagnon MD;  Location: [facility];  Service:            CURRENT MEDICATIONS:    ascorbic acid (ASCORBIC ACID WITH THEODORA HIPS) 500 MG tablet  aspirin 325 MG tablet  atorvastatin (LIPITOR) 10 MG tablet  calcium-vitamin D (CALCIUM-VITAMIN D) 500 mg(1,250mg) -200 unit per tablet  cholecalciferol, vitamin D3, 2,000 unit Tab  ferrous sulfate 65 mg elemental iron  furosemide (LASIX) 40 MG tablet  levothyroxine (SYNTHROID/LEVOTHROID) 88 MCG tablet  multivitamin therapeutic (THERAGRAN) tablet  omeprazole (PRILOSEC) 20 MG capsule  triamterene-hydrochlorothiazide (MAXZIDE-25) 37.5-25 mg per tablet         ALLERGIES:  Allergies   Allergen Reactions     Influenza Virus Vaccines [Influenza Vaccines] Unknown     \"flu like symptoms\"       FAMILY HISTORY:  Family History   Problem Relation Age of Onset     Ovarian Cancer Sister      Uterine Cancer Sister      Prostate Cancer Brother        SOCIAL HISTORY:   Social History     Socioeconomic History     Marital status: Single   Tobacco Use     Smoking status: Never Smoker   Substance and Sexual Activity     Alcohol use: No       VITALS:  BP (!) 140/73   Pulse 96   Temp 96.8  F (36  C) (Temporal)   Resp (!) 45   Wt 84.6 kg (186 lb 9.6 oz)   SpO2 91%   BMI 35.26 kg/m      PHYSICAL EXAM    Physical Exam  Constitutional:       General: She is in acute distress.      " Appearance: She is ill-appearing.   HENT:      Head: Normocephalic and atraumatic.      Mouth/Throat:      Mouth: Mucous membranes are dry.      Pharynx: Oropharynx is clear.   Eyes:      Pupils: Pupils are equal, round, and reactive to light.   Cardiovascular:      Rate and Rhythm: Normal rate and regular rhythm.      Pulses: Normal pulses.      Heart sounds: Normal heart sounds.   Pulmonary:      Effort: Tachypnea present.      Breath sounds: Normal breath sounds.   Abdominal:      General: Abdomen is flat. Bowel sounds are normal.      Palpations: Abdomen is soft.      Tenderness: There is abdominal tenderness in the right upper quadrant and epigastric area.   Musculoskeletal:         General: Normal range of motion.      Right lower leg: Pitting Edema present.      Left lower leg: Pitting Edema present.   Skin:     General: Skin is cool and dry.      Capillary Refill: Capillary refill takes less than 2 seconds.      Coloration: Skin is pale.   Neurological:      General: No focal deficit present.      Mental Status: She is alert and oriented to person, place, and time.      Comments: Moves all extremities equally          LAB:  All pertinent labs reviewed and interpreted.  Labs Ordered and Resulted from Time of ED Arrival to Time of ED Departure   INR - Abnormal       Result Value    INR 1.28 (*)    COMPREHENSIVE METABOLIC PANEL - Abnormal    Sodium 129 (*)     Potassium 5.3 (*)     Chloride 96 (*)     Carbon Dioxide (CO2) 21 (*)     Anion Gap 12      Urea Nitrogen 51 (*)     Creatinine 4.64 (*)     Calcium 8.6      Glucose 174 (*)     Alkaline Phosphatase 68      AST 13      ALT <9      Protein Total 6.1      Albumin 2.1 (*)     Bilirubin Total 0.7      GFR Estimate 8 (*)    TSH WITH FREE T4 REFLEX - Abnormal    TSH 36.69 (*)    BLOOD GAS VENOUS - Abnormal    pH Venous 7.27 (*)     pCO2 Venous 45      pO2 Venous 25      Bicarbonate Venous 19 (*)     Base Excess/Deficit (+/-) -6.2      Oxyhemoglobin Venous 33.7  (*)     O2 Sat, Venous 34.3 (*)    LACTIC ACID WHOLE BLOOD - Abnormal    Lactic Acid 3.7 (*)    CBC WITH PLATELETS AND DIFFERENTIAL - Abnormal    WBC Count 9.5      RBC Count 3.23 (*)     Hemoglobin 9.9 (*)     Hematocrit 29.7 (*)     MCV 92      MCH 30.7      MCHC 33.3      RDW 14.5      Platelet Count 346     DIFFERENTIAL - Abnormal    % Neutrophils 85      % Lymphocytes 6      % Monocytes 4      % Eosinophils 0      % Basophils 0      % Metamyelocytes 4      % Myelocytes 1      NRBCs per 100 WBC 1 (*)     Absolute Neutrophils 8.1      Absolute Lymphocytes 0.6 (*)     Absolute Monocytes 0.4      Absolute Eosinophils 0.0      Absolute Basophils 0.0      Absolute Metamyelocytes 0.4 (*)     Absolute Myelocytes 0.1 (*)     Absolute NRBCs 0.1 (*)     RBC Morphology Confirmed RBC Indices      Platelet Assessment        Value: Automated Count Confirmed. Platelet morphology is normal.   TROPONIN I - Normal    Troponin I 0.01     COVID-19 VIRUS (CORONAVIRUS) BY PCR - Normal    SARS CoV2 PCR Negative     GLUCOSE FLUID    Glucose Fluid Source Chest      Glucose fluid <5     ROUTINE UA WITH MICROSCOPIC REFLEX TO CULTURE   T4 FREE   PH FLUID   LACTATE DEHYDROGENASE FLUID   CELL COUNT BODY FLUID   DIFERENTIAL BODY FLUID   BLOOD CULTURE   BLOOD CULTURE   GRAM STAIN   AEROBIC BACTERIAL CULTURE ROUTINE   CELL COUNT WITH DIFFERENTIAL FLUID       RADIOLOGY:  Reviewed all pertinent imaging. Please see official radiology report.  XR Chest Port 1 View   Final Result   IMPRESSION: A radiopaque tube projects over the right chest. There is persistent partial collapse of the right lung, with pleural fluid surrounding the lung. Therefore, the tube may be malpositioned, or plugged. Also possible is trapped lung with    redistribution of the pleural fluid. There appears to be right inferior lung consolidation or mass.      There is mediastinal shift leftward. Stable heart size. Left lung appears clear.      XR Chest Port 1 View   Final Result    Abnormal   IMPRESSION: There is a moderate right pneumothorax distance between the visceral and parietal pleura at the right apex is approximately 3.5 cm. There is opacity over the lower two thirds the right hemithorax consistent with a combination of pleural    effusion and the mass. There is shift of the heart and mediastinum to the left despite the right pneumothorax. The left lung is clear.      NOTE: ABNORMAL REPORT      THE DICTATION ABOVE DESCRIBES AN ABNORMALITY FOR WHICH FOLLOW-UP IS NEEDED.   [Critical Result: Right pneumothorax with possible right lung mass]      Finding was identified on 7/13/2022 12:52 PM.       Dr. Marianne Greenwood was contacted by me on 7/13/2022 12:56 PM and verbalized understanding of the critical result.       Head CT w/o contrast    (Results Pending)   CT Chest Abdomen Pelvis w/o Contrast    (Results Pending)       EKG:    Performed at: 12;06    Impression: Sinus rhythm, nonspecific ST and T wave abnormality, abnormal EKG    Rate: 98  Rhythm: Sinus rhythm  Axis: 40  CO Interval: 178  QRS Interval: 86  QTc Interval: 403  ST Changes: ST no longer depressed in Inferior leads. Nonspecific T wave abnormality now evident in Inferior leads. T wave inversion now evident in Anterior leads.  Comparison: when compared to February 25, 2015 ST no longer depressed in Inferior leads. Nonspecific T wave abnormality now evident in Inferior leads. T wave inversion now evident in Anterior leads.    I have independently reviewed and interpreted the EKG(s) documented above.    PROCEDURES:     PROCEDURE: Tube Thoracostomy   TYPE: Right sided 8 fr cook cathter   INDICATIONS: It is medically necessary to place a chest tube for pneumothorax   PROCEDURE PROVIDER: Dr Daniela Greenwood   CONSENT: Risks, benefits and alternatives were discussed with and Verbal consent was obtained from Patient.   TIME OUT: Universal protocol was followed. TIME OUT conducted just prior to starting procedure confirmed patient  identity, site/side, procedure, patient position, and availability of correct equipment. Yes   SITE: Right chest   MEDICATIONS 10 mLs of 1% Lidocaine with epinephrine    Fentanyl, 50 mcg, IV   NOTE: Patient was placed in a semirecumbent position with the head of the bed at 30 degrees.  The right prepped with chlorhexidine. Patient was medicated as above. Incision was made anteriorly in the midclavicular line.  Blunt dissection up and over the rib was preformed until access was obtained to the pleural cavity.  A Cook catheter or 10 Estonian chest tube was placed and connected to Pleurovac on continuous suction. Tube was sutured in place with 2-0 silk, and all connections banded.    There was moderate.     Post procedure X-ray showing incomplete reexpansion of the lung.     COMPLICATIONS: Patient tolerated procedure well, without complication         I, Yuly Ulrich, am serving as a scribe to document services personally performed by Dr. Marianne Greenwood based on my observation and the provider's statements to me. IMarianne, DO attest that Yuly Ulrich is acting in a scribe capacity, has observed my performance of the services and has documented them in accordance with my direction.    Marianne Greenwood DO  Emergency Medicine  [facility] EMERGENCY ROOM  [address] [address] [phone]  Dept: [phone]         Marianne Greenwood DO  07/13/22 1913       Marianne Greenwood DO  07/13/22 1915

## 2022-07-13 NOTE — ED TRIAGE NOTES
Triage Assessment     Row Name 07/13/22 3366       Triage Assessment (Adult)    Airway WDL WDL       Respiratory WDL    Respiratory WDL X;rhythm/pattern    Rhythm/Pattern, Respiratory tachypneic  hypoxia 88% room air       Skin Circulation/Temperature WDL    Skin Circulation/Temperature WDL WDL       Cardiac WDL    Cardiac WDL WDL       Peripheral/Neurovascular WDL    Peripheral Neurovascular WDL WDL       Cognitive/Neuro/Behavioral WDL    Cognitive/Neuro/Behavioral WDL WDL            Has not been feeling well for the last 3 weeks with reported decreased appetite.  EMS report they were called because staff reports that she his more lethargic than baseline and has low BP and rapid pulse. EMS report BP has been fine for transport but O2 has been 88% with poor waveform. History of breast cancer and EMS states that family could be considering hospice placement for patient. Tachypneic and states feels short of breath. Knows she is in hospital and self

## 2022-07-14 NOTE — PROGRESS NOTES
was paged to provide spiritual care for patient who recently visited with a .  Patient is Sabianist and was waiting for her brothers to visit her but patient has transitioned and is active.   provided greeting, prayer, Sacrament of the anointing and blessing.  Patient was unresponsive but breathing and her eye was open.  Patient received the full blessing of the Catholic through the Sacrament of the anointing and more prayers will be said on behalf of patient from .    MATT Batista.    [phone]

## 2022-07-14 NOTE — PLAN OF CARE
Pt remains mostly unresponsive during shift with her only response being her eyes opening slightly on turns. She has been resting easy for most of the shift but does had wet sounding breath sounds and labored breathing with accessory muscle use and apneic periods. Her comfort has improved with the scopolamine patch and robinul. Mottling noted on bottom of bilateral feet. Generalized nonpitting edema also noted.     Goal Outcome Evaluation:        Problem: Plan of Care - These are the overarching goals to be used throughout the patient stay.    Goal: Optimal Comfort and Wellbeing  7/14/2022 1405 by Karen Rajan, RN  Outcome: Ongoing, Progressing  7/14/2022 1405 by Karen Rajan, RN  Outcome: Ongoing, Progressing     Problem: End-of-Life Care  Goal: Comfort, Peace and Preserved Dignity  Outcome: Ongoing, Progressing

## 2022-07-14 NOTE — PROGRESS NOTES
Attempted to meet with patient to complete CM assessment. Unfortunately, patient lethargic, pale, and audible crackles while breathing with supplemental oxygen via facemask. Per chart review, ED MD note 7/13,  patient from independent living apartment and interested in comfort cares/ hospice. Patient's brothers were updated yesterday by ED MD. Hospice consult pending.     Attempted to call patient's brothers to complete CM assessment. No answer, left generic voicemail requesting call back to hospital  [phone].

## 2022-07-14 NOTE — SIGNIFICANT EVENT
Significant Event Note    Time of event: 7:05 PM July 13, 2022    Description of event:  Paged by ED nurse, notified of abnormal gram stain of pleural fluid - positive for bacteria.    Plan:  Patient on comfort care, care plan fully discussed and established by AM team.    Consult pulmonary to verify chest tube in best position for maximal comfort of patient and to best control pneumothorax.    Patient desires to see a few more family members if possible.    Discussed with: bedside nurse    Evin Orellana MD

## 2022-07-14 NOTE — PROGRESS NOTES
[facility] Daily Progress Note    Assessment/Plan:  Alix Horan is a 89 year old old female with history of hypertension, hypothyroidism, dyslipidemia, breast cancer status post right breast lumpectomy and radiation treatment, diagnosed with right-sided pneumothorax with pleural effusion and right lung mass near mediastinum.  Patient is status post pigtail chest catheter placed by ED provider.  Patient is requesting no further testing, blood draws or medication for treatment of chest findings or other medical conditions.  She is requesting comfort care.  Patient was admitted and initiated on comfort cares.  Hospice consultation today.       Palliative care encounter  Right chest mass, pneumothorax and pleural effusion likely secondary to malignant effusion.  Acute respiratory failure with hypoxia  Status post chest tube placement in ED.  Palliative care consultation.  Spoke with Fang Houston from palliative care who will see patient later today.  Hospice care consultation.  Pulmonology consultation for chest tube management.   Continue comfort care order set: medication including ondansetron, prochlorperazine for nausea/vomiting, atropine for secretions, bisacodyl senna docusate for constipation, Dilaudid 1 to 2 mg oral every 2 hours as needed and 0.3 to 0.5 mg IV every 15 minutes as needed for severe pain or shortness of breath.     Acute kidney injury with hyperkalemia  Metabolic acidosis  Lactic acidosis  Hyponatremia.  Likely secondary to prerenal azotemia/dehydration from decreased oral intake.  Lactic acid 3.7  Creatinine 4.64 baseline 1.5 mg/dL.  Continue comfort cares as above.      Normocytic anemia  Hemoglobin 9.9 g/dL, asymptomatic, likely secondary to acute inflammation or chronic disease.      Chronic, stable conditions.   Hyperlipidemia  Hypothyroidism  Essential hypertension  Gastrosoft reflux disease    Diet: Regular Diet Adult  DVT Prophylaxis:  Comfort cares.   Code Status: No CPR- Do NOT  Intubate    Active Problems:    Lung mass    Pneumothorax on right    Acute respiratory failure with hypoxia (H)    Acute renal failure, unspecified acute renal failure type (H)     LOS: 1 day     Barriers to discharge: Monitor clinical progress, end of life.  Discharge Disposition: Possibly discharge with hospice care, but patient has shown significant progression in clinical condition, likely near end of life.     Subjective:  Patient resting, does not open eyes to voice.  Appears comfortable.  On oxymask at 10L/min, right sided chest tube in place, no discharge noted.       pantoprazole  40 mg Oral QAM AC    Or     pantoprazole  40 mg Oral QAM AC     senna-docusate  1 tablet Oral BID       Objective:  Vital signs in last 24 hours:  Temp:  [96.8  F (36  C)] 96.8  F (36  C)  Pulse:  [96-98] 96  Resp:  [31-45] 45  BP: (140)/(73-92) 140/73  SpO2:  [89 %-93 %] 91 %  Weight:   Weight:   @THISENCWEIGHTS(1)@  Weight change:   Body mass index is 35.26 kg/m .    Intake/Output last 3 shifts:  No intake/output data recorded.  Intake/Output this shift:  No intake/output data recorded.    Review of Systems:   As per subjective, all others negative.    Physical Exam:    GENERAL:  Sleeping, no arousable, appears comfortable, in no acute distress, appears stated age   HEAD:  Normocephalic, without obvious abnormality, atraumatic   NECK: Supple, symmetrical, trachea midline   LUNGS:   dminished breath sounds on right, rales at mid lung field on right, no wheezing, symmetric chest rise on inhalation, respirations unlabored   HEART:  Regular rate and rhythm, S1 and S2 normal, no murmur, rub, or gallop    ABDOMEN:   Soft, non-tender, bowel sounds active all four quadrants, no masses, no organomegaly, no rebound or guarding   EXTREMITIES: Extremities normal, atraumatic, no cyanosis or edema    SKIN: Dry to touch, no exanthems in the visualized areas   NEURO: Sleeping.      Imaging:  Personally Reviewed.  Results for orders placed or  performed during the hospital encounter of 07/13/22   XR Chest Port 1 View   Result Value Ref Range    Radiologist flags Right pneumothorax with possible right lung mass (AA)     Impression    IMPRESSION: There is a moderate right pneumothorax distance between the visceral and parietal pleura at the right apex is approximately 3.5 cm. There is opacity over the lower two thirds the right hemithorax consistent with a combination of pleural   effusion and the mass. There is shift of the heart and mediastinum to the left despite the right pneumothorax. The left lung is clear.    NOTE: ABNORMAL REPORT    THE DICTATION ABOVE DESCRIBES AN ABNORMALITY FOR WHICH FOLLOW-UP IS NEEDED.  [Critical Result: Right pneumothorax with possible right lung mass]    Finding was identified on 7/13/2022 12:52 PM.     Dr. Marianne Greenwood was contacted by me on 7/13/2022 12:56 PM and verbalized understanding of the critical result.    XR Chest Port 1 View    Impression    IMPRESSION: A radiopaque tube projects over the right chest. There is persistent partial collapse of the right lung, with pleural fluid surrounding the lung. Therefore, the tube may be malpositioned, or plugged. Also possible is trapped lung with   redistribution of the pleural fluid. There appears to be right inferior lung consolidation or mass.    There is mediastinal shift leftward. Stable heart size. Left lung appears clear.       Lab Results:  Personally Reviewed.   Recent Labs   Lab 07/13/22  1219   WBC 9.5   HGB 9.9*   HCT 29.7*        Recent Labs   Lab 07/13/22  1219   *   CO2 21*   BUN 51*   ALBUMIN 2.1*   ALKPHOS 68   ALT <9   AST 13     Recent Labs   Lab 07/13/22  1219   INR 1.28*       I reviewed all labs and imaging studies as of this date and I reviewed all current inpatient medications and updated them    Reyes Vaughn DO, MS  [facility] Service  Internal Medicine

## 2022-07-14 NOTE — PROGRESS NOTES
Admitted patient from ED. Has chest tube in place to right side. External catheter in place. Bubbles noted in canister when hooked to a suction machine.Patient's chest tube came out during cares. No purulent drainage noted.  Site is packed with dressing. Tip of tube is intact. Amount of drainage in the canister was 1550 ml, serosanguinous. Called brother, Cresencio, to update but went to voicemail. Left a message t return call.    Patient is unresponsive but eyes are open and moans in pain. Skin temperature is cold. Lower extremities are edematous and stiff. Toes are mottled and heels are blotchy red.    Administered with dilaudid PRN for pain. Patient is for comfort care. Reositioned for pressure relief.

## 2022-07-14 NOTE — PROGRESS NOTES
"Brother \"Cresencio\" visited patient at bedside. Per brother she has prepaid with [place] [employer]. Updated with continued plan of care. All questions answered and Cresencio verbalized understanding. Pt remains unresponsive.   "

## 2022-07-14 NOTE — PROGRESS NOTES
[facility]  Palliative Care Daily Progress Note      Code status: No CPR- Do NOT Intubate     Impressions, Recommendations & Counseling     SYMPTOM ASSESSMENT:  1. Shortness of breath and respiratory secretions 2/2 right lung mass, right pleural effusion and s/p CT placement  - Schedule atropine 2 drops SL QID  - Robinul 0.2 mg IV q4h x6 doses  - Scopolamine patch q72h  - Hydromorphone 0.3-0.5 mg IV q15 minutes prn  - Dilaudid solution 1-2 mg po/SL q2h prn   - If 2 ml volume too much for patient to take orally or SL route, recommend oxycodone 5-10 mg SL q2h prn     2. Generalized weakness  - Reposition for comfort    ADVANCED CARE PLANNING/GOALS OF CARE DISCUSSION  - DNR/I  - Continue comfort cares  - Brother report that patient has prearrangement with [place] [employer] when patient passes. Goals are for ongoing medication titration for symptom management.    Palliative care will continue to follow along. Please call [phone] with questions or needs.         HPI        Chart and notes reviewed.    Alix Horan is a 89 year old female with a history of breast cancer s/p radiation and lumpectomy, hypertension, hyperlipidemia, GERD and hypothyroidism. Patient presented to [facility] ED 7/13/2022 with altered mental status and generalized weakness. Admitted to the hospital with right lung mass and pleural effusion, Likely malignant, and DAVID.     Previous hospitalizations: None recently    Today, the patient was seen for:  Goals of care, patient and family support, sob, secretions    Prognosis, Goals, or Advance Care Planning was addressed today with: Yes.  Mood, coping, and/or meaning in the context of serious illness were addressed today: Yes.  Summary/Comments: Patient wished for comfort focused treatment when she spoke with admitting providers. Spoke with Kade coleman via telephone. Message left for Layo coleman. Layo Beavers and Kade, in agreement with comfort cares plan and  appreciative of calls to update them on patient condition - reported by Don. Goals are to adjust medications to promote comfort and alleviate suffering.  Continue comfort cares.            Interval History:     Chart review/discussion with unit or clinical team members:   Discussed with Karen Rajan RN and Dr. Vaughn.    [facility] Palliative Symptoms:  # Pain severity the last 12 hours: none  # Dyspnea severity the last 12 hours: none  # Nausea severity the last 12 hours: none  # Anxiety severity the last 12 hours: none             Review of Systems:     Besides above, an additional system ROS was reviewed. Patient unable to answer questions.          Medications:     I have reviewed this patient's medication profile and medications during this hospitalization.           Physical Exam:   Temp:  [96.8  F (36  C)] 96.8  F (36  C)  Pulse:  [96-98] 96  Resp:  [31-45] 45  BP: (140)/(73-92) 140/73  SpO2:  [89 %-93 %] 91 %    General appearance: fatigued and no distress  Head: Normocephalic, without obvious abnormality, atraumatic  Eyes: lids and lashes normal  Nose: no discharge  Throat: lips without lesions  Lungs: coarse bilaterally  Heart: regular rate  Abdomen: soft, hypoactive BS  Extremities: 2+ lower extremity edema  Skin: toes mottling  Neurologic: sleeping. nonresponsive             Data Reviewed:     Pertinent Labs  Lab Results: personally reviewed.   Lab Results   Component Value Date     07/13/2022    CO2 21 07/13/2022    BUN 51 07/13/2022     Lab Results   Component Value Date    WBC 9.5 07/13/2022    HGB 9.9 07/13/2022    HCT 29.7 07/13/2022    MCV 92 07/13/2022     07/13/2022     AST   Date Value Ref Range Status   07/13/2022 13 0 - 40 U/L Final     ALT   Date Value Ref Range Status   07/13/2022 <9 0 - 45 U/L Final     Alkaline Phosphatase   Date Value Ref Range Status   07/13/2022 68 45 - 120 U/L Final     Albumin   Date Value Ref Range Status   07/13/2022 2.1 (L) 3.5 - 5.0 g/dL Final          Radiology Results  XR Chest Port 1 View    Result Date: 7/13/2022  EXAM: XR CHEST PORT 1 VIEW LOCATION: [facility] DATE/TIME: 7/13/2022 1:32 PM INDICATION: Shortness of breath COMPARISON: 07/13/2022 12:43 PM     IMPRESSION: A radiopaque tube projects over the right chest. There is persistent partial collapse of the right lung, with pleural fluid surrounding the lung. Therefore, the tube may be malpositioned, or plugged. Also possible is trapped lung with redistribution of the pleural fluid. There appears to be right inferior lung consolidation or mass. There is mediastinal shift leftward. Stable heart size. Left lung appears clear.    XR Chest Port 1 View    Result Date: 7/13/2022  EXAM: XR CHEST PORT 1 VIEW LOCATION: [facility] DATE/TIME: 7/13/2022 12:40 PM INDICATION: Shortness of breath COMPARISON: 5/29/2007.     IMPRESSION: There is a moderate right pneumothorax distance between the visceral and parietal pleura at the right apex is approximately 3.5 cm. There is opacity over the lower two thirds the right hemithorax consistent with a combination of pleural effusion and the mass. There is shift of the heart and mediastinum to the left despite the right pneumothorax. The left lung is clear. NOTE: ABNORMAL REPORT THE DICTATION ABOVE DESCRIBES AN ABNORMALITY FOR WHICH FOLLOW-UP IS NEEDED. [Critical Result: Right pneumothorax with possible right lung mass] Finding was identified on 7/13/2022 12:52 PM. Dr. Marianne Greenwood was contacted by me on 7/13/2022 12:56 PM and verbalized understanding of the critical result.      TTS: I have personally spent a total of 35 minutes today reviewing patient's medical record, consultation with the medical providers, assessing patient and symptoms and providing emotional support with more than 50% of this time spent in coordination of care re: medications for symptom management and discussion with family.    MATT Mclaughlin,  APRN, CNS  Palliative Care  [phone]

## 2022-07-14 NOTE — PROVIDER NOTIFICATION
8:57 PM Paged Dr Evin Orellana patient is comfort care. She came up with a chest tube but it has fallen out. Pulmonology consult for tomorrow to discuss the tube. Please advise     Per Dr Orellana cancel pulmonology consult as chest tube is no longer an issue. Continue to keep comfortable. He recommends that we notify family.

## 2022-07-14 NOTE — CONSULTS
Hospice referral received. Talked with Caprice MAGALLON, stopped in pt's room. Pt's nurse Karen and NS Dionisio present in room. Pt is unresponsive, respirations 10 with short periods of apnea, mottling on LE. Pt appears comfortable. Pt continues to received IV hydromorphone prn. Called and talked with pt's brother Cresencio, he states that he was here to see pt earlier and believes pt is expected to reach end of life in hospital. Informed him hospice is available as needed and will continue to follow. Caprice MAGALLON updated.     Thank you for this referral,     Terri Smith RN BSN PHN PN  Hospice Referral Specialist  [facility]    [phone]  [email or web address]

## 2022-07-15 NOTE — PLAN OF CARE
Problem: End-of-Life Care  Goal: Comfort, Peace and Preserved Dignity  Outcome: Ongoing, Progressing     Problem: Plan of Care - These are the overarching goals to be used throughout the patient stay.    Goal: Optimal Comfort and Wellbeing  Outcome: Ongoing, Progressing   Goal Outcome Evaluation:  On the evening shift, the patient was nonverbal, and she only opened her eyes once during the shift. She was not able to nod or answer questions in any way, and she did not tolerate turns very well.   The patient got PRN IV Dilaudid, 0.3 mg, for dyspnea.

## 2022-07-15 NOTE — PROGRESS NOTES
Pt is Worship. She was anointed 7/13/22.   provided bedside prayer today. Pt unresponsive.    Spiritual Care is available as needed or requested.    SHANTELL Karimi.

## 2022-07-15 NOTE — PROGRESS NOTES
[facility]  Palliative Care Daily Progress Note      Code status: No CPR- Do NOT Intubate     Impressions, Recommendations & Counseling     SYMPTOM ASSESSMENT:  1. Shortness of breath and respiratory secretions 2/2 right lung mass, right pleural effusion and s/p CT placement  - Schedule atropine 2 drops SL QID prn  - Robinul 0.2 mg IV q4h x 6 more doses  - Scopolamine patch q72h  - Hydromorphone 0.3-0.5 mg IV q15 minutes prn, Patient has used 4 mg IV in last 24 hours. Equipotent to 75 PME or 50 mg po oxycodone without dose reduction for cross tolerance.  Recommend leaving IV medication for severe uncontrolled symptoms while patient has patent IV. Would not replace IV should it infiltrate.  Discontinue oral Dilaudid solution as do not have concentrated form.  - Schedule oxycodone 10 mg SL q6h with 5-10 mg SL q2h prn.   - Lorazepam 1 mg IV/SL q3h prn. Patient has used 2 mg in last 10 hours.   - Schedule lorazepam solution 0.5 mg SL q6h (given with scheduled oxycodone to cluster cares).     2. Generalized weakness  - Reposition for comfort     ADVANCED CARE PLANNING/GOALS OF CARE DISCUSSION  - DNR/I  - Continue comfort cares.  - Patient actively dying and will likely die here in the hospital.  - Goals are for ongoing medication titration for symptom management.  - Message left for brotherLayo.     Palliative care will continue to follow along. Please call [phone] with questions or needs.         HPI          Alix Horan is a 89 year old female with a history of breast cancer s/p radiation and lumpectomy, hypertension, hyperlipidemia, GERD and hypothyroidism. Patient presented to [facility] ED 7/13/2022 with altered mental status and generalized weakness. Admitted to the hospital with right lung mass and pleural effusion, Likely malignant, and DAVID.      Previous hospitalizations: None recently     Today, the patient was seen for:  Goals of care, patient and family support, sob,  secretions    Prognosis, Goals, or Advance Care Planning was addressed today with: Yes.  Mood, coping, and/or meaning in the context of serious illness were addressed today: Yes.  Summary/Comments: Continue comfort cares and medication adjustment for management of pain, discomfort, restlessness and shortness of breath.            Interval History:     Chart review/discussion with unit or clinical team members:   Discussed with Nely Collins RN and updated Dr. Vaughn. Medication adjusted for symptom control.    Key Palliative Symptoms:  # Pain severity the last 12 hours: none  # Dyspnea severity the last 12 hours: none  # Nausea severity the last 12 hours: none  # Anxiety severity the last 12 hours: none             Review of Systems:     Besides above, an additional system ROS was reviewed and is unremarkable          Medications:     I have reviewed this patient's medication profile and medications during this hospitalization.           Physical Exam:   Resp:  [10-24] 24    General appearance: no distress and non-responsive  Head: Normocephalic, without obvious abnormality, atraumatic  Eyes: lids normal  Nose: no discharge  Throat: lips without lesions, oral mucosa pink  Lungs: decreased bases; scattered rhonchi anterior, upper airway secretions noted  Heart: regular rate  Abdomen: soft, nontender  Extremities: 1-2+ lower extremity edema  Neurologic: non-responsive.             Data Reviewed:     Pertinent Labs  Lab Results: personally reviewed.   AST   Date Value Ref Range Status   07/13/2022 13 0 - 40 U/L Final     ALT   Date Value Ref Range Status   07/13/2022 <9 0 - 45 U/L Final     Alkaline Phosphatase   Date Value Ref Range Status   07/13/2022 68 45 - 120 U/L Final     Albumin   Date Value Ref Range Status   07/13/2022 2.1 (L) 3.5 - 5.0 g/dL Final         Radiology Results  XR Chest Port 1 View    Result Date: 7/13/2022  EXAM: XR CHEST PORT 1 VIEW LOCATION: [facility]  DATE/TIME: 7/13/2022 1:32 PM INDICATION: Shortness of breath COMPARISON: 07/13/2022 12:43 PM     IMPRESSION: A radiopaque tube projects over the right chest. There is persistent partial collapse of the right lung, with pleural fluid surrounding the lung. Therefore, the tube may be malpositioned, or plugged. Also possible is trapped lung with redistribution of the pleural fluid. There appears to be right inferior lung consolidation or mass. There is mediastinal shift leftward. Stable heart size. Left lung appears clear.    XR Chest Port 1 View    Result Date: 7/13/2022  EXAM: XR CHEST PORT 1 VIEW LOCATION: [facility] DATE/TIME: 7/13/2022 12:40 PM INDICATION: Shortness of breath COMPARISON: 5/29/2007.     IMPRESSION: There is a moderate right pneumothorax distance between the visceral and parietal pleura at the right apex is approximately 3.5 cm. There is opacity over the lower two thirds the right hemithorax consistent with a combination of pleural effusion and the mass. There is shift of the heart and mediastinum to the left despite the right pneumothorax. The left lung is clear. NOTE: ABNORMAL REPORT THE DICTATION ABOVE DESCRIBES AN ABNORMALITY FOR WHICH FOLLOW-UP IS NEEDED. [Critical Result: Right pneumothorax with possible right lung mass] Finding was identified on 7/13/2022 12:52 PM. Dr. Marianne Greenwood was contacted by me on 7/13/2022 12:56 PM and verbalized understanding of the critical result.        TTS: I have personally spent a total of 35 minutes today reviewing patient's medical record, consultation with the medical and unit providers >50% assessing patient and symptoms and providing emotional support and reaching out to staff and family.    MATT Mclaughlin, SUSANNE, CNS  Palliative Care  [phone]

## 2022-07-15 NOTE — PROGRESS NOTES
Care Management Follow Up    Length of Stay (days): 2    Expected Discharge Date: 07/15/2022     Concerns to be Addressed:     Medical mgmt of comfort cares for EOL pt  Patient plan of care discussed at interdisciplinary rounds: Yes    Anticipated Discharge Disposition: End-of-life anticipated in hospital   Anticipated Discharge Services:    Anticipated Discharge DME:      Additional Information:    Brother Cresencio is in communication with pt/staff. Anticipate that pt will pass in hospital setting. No CM needs identified at this time. CM available as needed.       NILAM LopezSW

## 2022-07-15 NOTE — SIGNIFICANT EVENT
Death Note    Pt seen and examined. No response to sternal rub. Pupils nonreactive to light. No audible breath sounds. No chest excursions noted. No heart tones appreciated. No peripheral pulses.    Pt pronounced dead at 1636 on 7/15/2022.     Family notified by nursing.     Reyes Vaughn DO, MS  [facility] Service  Internal Medicine

## 2022-07-15 NOTE — DISCHARGE SUMMARY
[facility]    Death Summary - Hospitalist Service     Date of Admission:  7/13/2022  Date of Death: 7/15/2022  Discharging Provider: Reyes Vaughn DO    Discharge Diagnoses   Right chest mass  Pneumothorax  Exudative pleural effusion  Community acquired pneumonia with parapneumonic effusion  Acute respiratory failure with hypoxia  Acute kidney injury   Hyperkalemia  Lactic acidosis  Hyponatremia  Normocytic anemia  Hyperlipidemia  Hypothyroidism  Essential hypertension  Gastroesophageal reflux disease    Cause of death: Community acquired pneumonia with parapneumonic effusion    Hospital Course    Alix Horan is a 89 year old old female with history of hypertension, hypothyroidism, dyslipidemia, breast cancer status post right breast lumpectomy and radiation treatment, diagnosed with right-sided pneumothorax with pleural effusion and right lung mass near mediastinum with associated acute respiratory failure with hypoxia.  Patient had chest tube placed in ED and this was dislodged loose during transfer to floor and removed on 7/13/22.  Fluid analysis/culture revealed excudative effusion, and suspected community acquired pneumonia, with parapneumonic effusion growing multiple organisms including S.anginosus. She had associated lactic acidosis and acute kidney failure likely secondary to prerenal azotemia.  Patient requested no further testing, blood draws or medication for treatment of chest findings or other medical conditions.  She requested comfort care on admission.  She developed anuric renal failure on 7/14/22.  She was initiated on comfort cares and passed away comfortably on 7/15/22.      DO SHANTELL Pollock [facility]  ______________________________________________________________________  Significant Results and Procedures   Most Recent 3 CBC's:Recent Labs   Lab Test 07/13/22  1219   WBC 9.5   HGB 9.9*   MCV 92        Most Recent 3  BMP's:Recent Labs   Lab Test 07/13/22  1219 06/16/22  1541 04/06/22  1044   * 131* 136   POTASSIUM 5.3* 4.4 4.5   CHLORIDE 96* 95* 102   CO2 21* 21* 22   BUN 51* 31* 24   CR 4.64* 1.50* 1.31*   ANIONGAP 12 15 12   KIAH 8.6 8.5 9.8   * 156* 92     Most Recent 2 LFT's:Recent Labs   Lab Test 07/13/22  1219 12/21/21  1051   AST 13 19   ALT <9 11   ALKPHOS 68 58   BILITOTAL 0.7 0.4     Most Recent 3 INR's:Recent Labs   Lab Test 07/13/22  1219   INR 1.28*     Most Recent TSH and T4:Recent Labs   Lab Test 07/13/22  1219   TSH 36.69*   T4 0.70*     Most Recent Hemoglobin A1c:Recent Labs   Lab Test 01/07/20  1149   A1C 5.8     Most Recent 6 glucoses:Recent Labs   Lab Test 07/13/22  1219 06/16/22  1541 04/06/22  1044 01/18/22  1152 12/21/21  1051 03/29/21  1213   * 156* 92 96 109 100     Most Recent Anemia Panel:Recent Labs   Lab Test 07/13/22  1219 06/16/22  1541 12/08/20  1110 09/29/20  1049   WBC 9.5  --   --   --    HGB 9.9*  --   --   --    HCT 29.7*  --   --   --    MCV 92  --   --   --      --   --   --    IRON  --  22*   < > 91   NEERU  --  292*   < > 359*   B12  --   --   --  521    < > = values in this interval not displayed.   ,   Results for orders placed or performed during the hospital encounter of 07/13/22   XR Chest Port 1 View     Value    Radiologist flags Right pneumothorax with possible right lung mass (AA)    Narrative    EXAM: XR CHEST PORT 1 VIEW  LOCATION: [facility]  DATE/TIME: 7/13/2022 12:40 PM    INDICATION: Shortness of breath  COMPARISON: 5/29/2007.      Impression    IMPRESSION: There is a moderate right pneumothorax distance between the visceral and parietal pleura at the right apex is approximately 3.5 cm. There is opacity over the lower two thirds the right hemithorax consistent with a combination of pleural   effusion and the mass. There is shift of the heart and mediastinum to the left despite the right pneumothorax. The left lung is  clear.    NOTE: ABNORMAL REPORT    THE DICTATION ABOVE DESCRIBES AN ABNORMALITY FOR WHICH FOLLOW-UP IS NEEDED.  [Critical Result: Right pneumothorax with possible right lung mass]    Finding was identified on 7/13/2022 12:52 PM.     Dr. Marianne Greenwood was contacted by me on 7/13/2022 12:56 PM and verbalized understanding of the critical result.    XR Chest Port 1 View    Narrative    EXAM: XR CHEST PORT 1 VIEW  LOCATION: [facility]  DATE/TIME: 7/13/2022 1:32 PM    INDICATION: Shortness of breath  COMPARISON: 07/13/2022 12:43 PM      Impression    IMPRESSION: A radiopaque tube projects over the right chest. There is persistent partial collapse of the right lung, with pleural fluid surrounding the lung. Therefore, the tube may be malpositioned, or plugged. Also possible is trapped lung with   redistribution of the pleural fluid. There appears to be right inferior lung consolidation or mass.    There is mediastinal shift leftward. Stable heart size. Left lung appears clear.       Consultations This Hospital Stay   PALLIATIVE CARE ADULT IP CONSULT  SPIRITUAL HEALTH SERVICES IP CONSULT  PULMONARY IP CONSULT  SPIRITUAL HEALTH SERVICES IP CONSULT  INPATIENT HOSPICE ADULT CONSULT  PULMONARY IP CONSULT    Primary Care Physician   Yumiko Atkins    Time Spent on this Encounter   IReyes DO, personally saw the patient today and spent greater than 30 minutes discharging this patient.

## 2022-07-15 NOTE — PLAN OF CARE
Problem: End-of-Life Care  Goal: Comfort, Peace and Preserved Dignity  Outcome: Ongoing, Progressing       Goal Outcome Evaluation: Patient is nonverbal. Some grimacing and moaning especially with turns early on in shift, seems to have responded well to ativan and this is decreased. Did not open eyes. Oral care performed, PRN dilaudid and ativan given, robinul scheduled. Patient breathing labored with periods of apnea, remains on 6L oxymask. No urine output. Chest tube dressing CDI. BLEs cool and mottled. Patient continues to rest comfortably.

## 2022-07-15 NOTE — PLAN OF CARE
Goal Outcome Evaluation:    Patient passed at approx 1636 on 07/15/2022. Security came and removed her from 232 at approx 1825. Her personal belongings were sent to the [facility]. Family, [employer], and [employer] we notified. [employer] will be here tonight at approx 2030.

## 2022-07-15 NOTE — PROGRESS NOTES
[facility] Daily Progress Note    Assessment/Plan:  Alix Horan is a 89 year old old female with history of hypertension, hypothyroidism, dyslipidemia, breast cancer status post right breast lumpectomy and radiation treatment, diagnosed with right-sided pneumothorax with pleural effusion and right lung mass near mediastinum.  Patient had chest tube placed in ED and this was dislodged loose during transfer to floor and removed on 7/13/22.  Fluid analysisculture revealed excudative effusion, and suspect community acquired pneumonia, with parapneumonic effusion growing multiple organisms including S.anginosus. Patient is requesting no further testing, blood draws or medication for treatment of chest findings or other medical conditions.  She requested comfort care on admission.  Patient was admitted and initiated on comfort cares.       Palliative care encounter  Right chest mass, pneumothorax and exudative pleural effusion  Community acquired pneumonia, with parapneumonic effusion growing multiple organisms including S.anginosus.   Acute respiratory failure with hypoxia  Status post chest tube placement in ED.  Palliative care consultation appreciated.  Pulmonology consultation for chest tube management.   Continue comfort care order set: medication including ondansetron, prochlorperazine for nausea/vomiting, atropine for secretions, bisacodyl senna docusate for constipation, Oxycodone 10 mg SL q6h with 5-10 mg SL q2h prn.      Acute kidney injury with hyperkalemia  Metabolic acidosis  Lactic acidosis  Hyponatremia.  Likely secondary to prerenal azotemia/dehydration from decreased oral intake.  Lactic acid 3.7  Creatinine 4.64 baseline 1.5 mg/dL.  No urine output reported over last shift.   Continue comfort cares as above.      Normocytic anemia  Hemoglobin 9.9 g/dL, asymptomatic, likely secondary to acute inflammation or chronic disease.   Chronic, stable conditions.   Hyperlipidemia  Hypothyroidism  Essential  hypertension  Gastrosoft reflux disease     Diet: Regular Diet Adult  DVT Prophylaxis:  Comfort Care.   Code Status: No CPR- Do NOT Intubate    Active Problems:    Lung mass    Pneumothorax on right    Acute respiratory failure with hypoxia (H)    Acute renal failure, unspecified acute renal failure type (H)     LOS: 2 days     Barriers to discharge: Continue comfort cares, follow clinical condition.  Discharge Disposition: Continue comfort cares in hospital at this time.      Subjective:  Patient is unresponsive, appears comfortable per nursing staff.       glycopyrrolate  0.2 mg Intravenous Q4H     pantoprazole  40 mg Oral QAM AC    Or     pantoprazole  40 mg Oral QAM AC     scopolamine   Transdermal Q8H     senna-docusate  1 tablet Oral BID       Objective:  Vital signs in last 24 hours:  Resp:  [10-24] 24  Weight:   Weight:   @THISENCWEIGHTS(1)@  Weight change:   Body mass index is 35.26 kg/m .    Intake/Output last 3 shifts:  No intake/output data recorded.  Intake/Output this shift:  No intake/output data recorded.    Review of Systems:   As per subjective, all others negative.    Physical Exam:  GENERAL:  Sleeping, not arousable, appears comfortable, in no acute distress, appears stated age   HEAD:  Normocephalic, without obvious abnormality, atraumatic   NECK: Supple, symmetrical, trachea midline   LUNGS:   dminished breath sounds on right, rales at mid lung field on right, no wheezing, symmetric chest rise on inhalation, respirations unlabored   HEART:  Regular rate and rhythm, S1 and S2 normal, no murmur, rub, or gallop    ABDOMEN:   Soft, non-tender, bowel sounds active all four quadrants, no masses, no organomegaly, no rebound or guarding   EXTREMITIES: Extremities normal, atraumatic, no cyanosis or edema    SKIN: Dry to touch, no exanthems in the visualized areas   NEURO: Sleeping.         Imaging:  Personally Reviewed.  Results for orders placed or performed during the hospital encounter of 07/13/22    XR Chest Port 1 View   Result Value Ref Range    Radiologist flags Right pneumothorax with possible right lung mass (AA)     Impression    IMPRESSION: There is a moderate right pneumothorax distance between the visceral and parietal pleura at the right apex is approximately 3.5 cm. There is opacity over the lower two thirds the right hemithorax consistent with a combination of pleural   effusion and the mass. There is shift of the heart and mediastinum to the left despite the right pneumothorax. The left lung is clear.    NOTE: ABNORMAL REPORT    THE DICTATION ABOVE DESCRIBES AN ABNORMALITY FOR WHICH FOLLOW-UP IS NEEDED.  [Critical Result: Right pneumothorax with possible right lung mass]    Finding was identified on 7/13/2022 12:52 PM.     Dr. Marianne Greenwood was contacted by me on 7/13/2022 12:56 PM and verbalized understanding of the critical result.    XR Chest Port 1 View    Impression    IMPRESSION: A radiopaque tube projects over the right chest. There is persistent partial collapse of the right lung, with pleural fluid surrounding the lung. Therefore, the tube may be malpositioned, or plugged. Also possible is trapped lung with   redistribution of the pleural fluid. There appears to be right inferior lung consolidation or mass.    There is mediastinal shift leftward. Stable heart size. Left lung appears clear.       Lab Results:  Personally Reviewed.   Recent Labs   Lab 07/13/22  1219   WBC 9.5   HGB 9.9*   HCT 29.7*        Recent Labs   Lab 07/13/22  1219   *   CO2 21*   BUN 51*   ALBUMIN 2.1*   ALKPHOS 68   ALT <9   AST 13     Recent Labs   Lab 07/13/22  1219   INR 1.28*       I reviewed all labs and imaging studies as of this date and I reviewed all current inpatient medications and updated them    Reyes Vaughn DO, MS  [facility] Service  Internal Medicine

## 2022-07-18 LAB — BACTERIA BLD CULT: NO GROWTH

## 2022-07-21 LAB
BACTERIA PLR CULT: ABNORMAL
GRAM STAIN RESULT: ABNORMAL